# Patient Record
Sex: MALE | Race: BLACK OR AFRICAN AMERICAN | NOT HISPANIC OR LATINO | Employment: FULL TIME | URBAN - METROPOLITAN AREA
[De-identification: names, ages, dates, MRNs, and addresses within clinical notes are randomized per-mention and may not be internally consistent; named-entity substitution may affect disease eponyms.]

---

## 2018-10-01 ENCOUNTER — OFFICE VISIT CONVERTED (OUTPATIENT)
Dept: FAMILY MEDICINE CLINIC | Facility: CLINIC | Age: 38
End: 2018-10-01
Attending: FAMILY MEDICINE

## 2018-10-01 ENCOUNTER — CONVERSION ENCOUNTER (OUTPATIENT)
Dept: OTHER | Facility: HOSPITAL | Age: 38
End: 2018-10-01

## 2018-10-09 ENCOUNTER — OFFICE VISIT CONVERTED (OUTPATIENT)
Dept: CARDIOLOGY | Facility: CLINIC | Age: 38
End: 2018-10-09
Attending: SPECIALIST

## 2018-10-09 ENCOUNTER — CONVERSION ENCOUNTER (OUTPATIENT)
Dept: CARDIOLOGY | Facility: CLINIC | Age: 38
End: 2018-10-09

## 2018-11-01 ENCOUNTER — OFFICE VISIT CONVERTED (OUTPATIENT)
Dept: FAMILY MEDICINE CLINIC | Facility: CLINIC | Age: 38
End: 2018-11-01
Attending: FAMILY MEDICINE

## 2018-11-01 ENCOUNTER — CONVERSION ENCOUNTER (OUTPATIENT)
Dept: FAMILY MEDICINE CLINIC | Facility: CLINIC | Age: 38
End: 2018-11-01

## 2018-12-07 ENCOUNTER — OFFICE VISIT CONVERTED (OUTPATIENT)
Dept: FAMILY MEDICINE CLINIC | Facility: CLINIC | Age: 38
End: 2018-12-07
Attending: NURSE PRACTITIONER

## 2019-05-02 ENCOUNTER — CONVERSION ENCOUNTER (OUTPATIENT)
Dept: FAMILY MEDICINE CLINIC | Facility: CLINIC | Age: 39
End: 2019-05-02

## 2019-05-02 ENCOUNTER — OFFICE VISIT CONVERTED (OUTPATIENT)
Dept: FAMILY MEDICINE CLINIC | Facility: CLINIC | Age: 39
End: 2019-05-02
Attending: NURSE PRACTITIONER

## 2019-05-29 ENCOUNTER — OFFICE VISIT CONVERTED (OUTPATIENT)
Dept: FAMILY MEDICINE CLINIC | Facility: CLINIC | Age: 39
End: 2019-05-29
Attending: NURSE PRACTITIONER

## 2019-05-29 ENCOUNTER — CONVERSION ENCOUNTER (OUTPATIENT)
Dept: FAMILY MEDICINE CLINIC | Facility: CLINIC | Age: 39
End: 2019-05-29

## 2019-06-04 ENCOUNTER — OFFICE VISIT CONVERTED (OUTPATIENT)
Dept: CARDIOLOGY | Facility: CLINIC | Age: 39
End: 2019-06-04
Attending: SPECIALIST

## 2019-10-16 ENCOUNTER — HOSPITAL ENCOUNTER (OUTPATIENT)
Dept: URGENT CARE | Facility: CLINIC | Age: 39
Discharge: HOME | End: 2019-10-16

## 2019-10-18 LAB — BACTERIA SPEC AEROBE CULT: NORMAL

## 2020-02-15 ENCOUNTER — HOSPITAL ENCOUNTER (OUTPATIENT)
Dept: URGENT CARE | Facility: CLINIC | Age: 40
Discharge: HOME | End: 2020-02-15

## 2020-02-17 ENCOUNTER — OFFICE VISIT CONVERTED (OUTPATIENT)
Dept: FAMILY MEDICINE CLINIC | Facility: CLINIC | Age: 40
End: 2020-02-17
Attending: NURSE PRACTITIONER

## 2020-02-17 ENCOUNTER — CONVERSION ENCOUNTER (OUTPATIENT)
Dept: FAMILY MEDICINE CLINIC | Facility: CLINIC | Age: 40
End: 2020-02-17

## 2020-02-28 ENCOUNTER — OFFICE VISIT CONVERTED (OUTPATIENT)
Dept: FAMILY MEDICINE CLINIC | Facility: CLINIC | Age: 40
End: 2020-02-28
Attending: NURSE PRACTITIONER

## 2020-02-28 ENCOUNTER — HOSPITAL ENCOUNTER (OUTPATIENT)
Dept: FAMILY MEDICINE CLINIC | Facility: CLINIC | Age: 40
Discharge: HOME | End: 2020-02-28
Attending: NURSE PRACTITIONER

## 2020-02-28 ENCOUNTER — CONVERSION ENCOUNTER (OUTPATIENT)
Dept: FAMILY MEDICINE CLINIC | Facility: CLINIC | Age: 40
End: 2020-02-28

## 2020-02-28 LAB
APPEARANCE UR: CLEAR
BILIRUB UR QL: NEGATIVE
COLOR UR: YELLOW
CONV BACTERIA: NEGATIVE
CONV COLLECTION SOURCE (UA): ABNORMAL
CONV HYALINE CASTS IN URINE MICRO: ABNORMAL /[LPF]
CONV UROBILINOGEN IN URINE BY AUTOMATED TEST STRIP: 1 {EHRLICHU}/DL (ref 0.1–1)
GLUCOSE UR QL: NEGATIVE MG/DL
HGB UR QL STRIP: ABNORMAL
KETONES UR QL STRIP: ABNORMAL MG/DL
LEUKOCYTE ESTERASE UR QL STRIP: ABNORMAL
NITRITE UR QL STRIP: NEGATIVE
PH UR STRIP.AUTO: 6.5 [PH] (ref 5–8)
PROT UR QL: ABNORMAL MG/DL
RBC #/AREA URNS HPF: ABNORMAL /[HPF]
SP GR UR: 1.02 (ref 1–1.03)
WBC #/AREA URNS HPF: ABNORMAL /[HPF]

## 2020-03-01 LAB — BACTERIA UR CULT: NORMAL

## 2020-03-02 LAB
C TRACH RRNA CVX QL NAA+PROBE: NOT DETECTED
CONV HIV-1/ HIV-2: NEGATIVE
HSV I/II IGM: 1.13 RATIO (ref 0–0.9)
HSV1 IGG SER IA-ACNC: 32.6 INDEX (ref 0–0.9)
HSV2 IGG SER IA-ACNC: <0.91 INDEX (ref 0–0.9)
N GONORRHOEA DNA SPEC QL NAA+PROBE: NOT DETECTED
RPR SER QL: ABNORMAL

## 2020-04-03 ENCOUNTER — HOSPITAL ENCOUNTER (OUTPATIENT)
Dept: FAMILY MEDICINE CLINIC | Facility: CLINIC | Age: 40
Discharge: HOME | End: 2020-04-03
Attending: FAMILY MEDICINE

## 2020-04-03 ENCOUNTER — OFFICE VISIT CONVERTED (OUTPATIENT)
Dept: FAMILY MEDICINE CLINIC | Facility: CLINIC | Age: 40
End: 2020-04-03
Attending: FAMILY MEDICINE

## 2020-04-03 LAB
C TRACH RRNA CVX QL NAA+PROBE: NOT DETECTED
N GONORRHOEA DNA SPEC QL NAA+PROBE: NOT DETECTED

## 2020-04-05 LAB — BACTERIA UR CULT: NORMAL

## 2020-04-22 ENCOUNTER — HOSPITAL ENCOUNTER (OUTPATIENT)
Dept: URGENT CARE | Facility: CLINIC | Age: 40
Discharge: HOME | End: 2020-04-22
Attending: PHYSICIAN ASSISTANT

## 2020-04-22 LAB
C TRACH RRNA CVX QL NAA+PROBE: NOT DETECTED
N GONORRHOEA DNA SPEC QL NAA+PROBE: NOT DETECTED

## 2020-07-23 ENCOUNTER — HOSPITAL ENCOUNTER (OUTPATIENT)
Dept: URGENT CARE | Facility: CLINIC | Age: 40
Discharge: HOME | End: 2020-07-23
Attending: EMERGENCY MEDICINE

## 2020-07-23 LAB
C TRACH RRNA CVX QL NAA+PROBE: NOT DETECTED
N GONORRHOEA DNA SPEC QL NAA+PROBE: NOT DETECTED

## 2020-07-29 ENCOUNTER — HOSPITAL ENCOUNTER (OUTPATIENT)
Dept: URGENT CARE | Facility: CLINIC | Age: 40
Discharge: HOME | End: 2020-07-29

## 2020-08-14 ENCOUNTER — HOSPITAL ENCOUNTER (OUTPATIENT)
Dept: FAMILY MEDICINE CLINIC | Facility: CLINIC | Age: 40
Discharge: HOME | End: 2020-08-14
Attending: FAMILY MEDICINE

## 2020-08-14 ENCOUNTER — CONVERSION ENCOUNTER (OUTPATIENT)
Dept: OTHER | Facility: HOSPITAL | Age: 40
End: 2020-08-14

## 2020-08-14 ENCOUNTER — OFFICE VISIT CONVERTED (OUTPATIENT)
Dept: FAMILY MEDICINE CLINIC | Facility: CLINIC | Age: 40
End: 2020-08-14
Attending: FAMILY MEDICINE

## 2020-08-15 LAB
CHOLEST SERPL-MCNC: 207 MG/DL (ref 107–200)
CHOLEST/HDLC SERPL: 4.2 {RATIO} (ref 3–6)
CK SERPL-CCNC: 883 U/L (ref 57–374)
HDLC SERPL-MCNC: 49 MG/DL (ref 40–60)
LDLC SERPL CALC-MCNC: 136 MG/DL (ref 70–100)
TRIGL SERPL-MCNC: 108 MG/DL (ref 40–150)
VLDLC SERPL-MCNC: 22 MG/DL (ref 5–37)

## 2020-08-17 LAB — SARS-COV-2 RNA SPEC QL NAA+PROBE: NOT DETECTED

## 2020-08-21 ENCOUNTER — OFFICE VISIT CONVERTED (OUTPATIENT)
Dept: CARDIOLOGY | Facility: CLINIC | Age: 40
End: 2020-08-21
Attending: SPECIALIST

## 2020-08-21 ENCOUNTER — CONVERSION ENCOUNTER (OUTPATIENT)
Dept: CARDIOLOGY | Facility: CLINIC | Age: 40
End: 2020-08-21

## 2020-10-02 ENCOUNTER — HOSPITAL ENCOUNTER (OUTPATIENT)
Dept: URGENT CARE | Facility: CLINIC | Age: 40
Discharge: HOME | End: 2020-10-02
Attending: PHYSICIAN ASSISTANT

## 2020-10-13 ENCOUNTER — OFFICE VISIT CONVERTED (OUTPATIENT)
Dept: FAMILY MEDICINE CLINIC | Facility: CLINIC | Age: 40
End: 2020-10-13
Attending: FAMILY MEDICINE

## 2020-10-13 ENCOUNTER — CONVERSION ENCOUNTER (OUTPATIENT)
Dept: FAMILY MEDICINE CLINIC | Facility: CLINIC | Age: 40
End: 2020-10-13

## 2020-10-13 ENCOUNTER — OFFICE VISIT CONVERTED (OUTPATIENT)
Dept: CARDIOLOGY | Facility: CLINIC | Age: 40
End: 2020-10-13
Attending: SPECIALIST

## 2020-12-01 ENCOUNTER — HOSPITAL ENCOUNTER (OUTPATIENT)
Dept: URGENT CARE | Facility: CLINIC | Age: 40
Discharge: HOME | End: 2020-12-01
Attending: EMERGENCY MEDICINE

## 2020-12-26 ENCOUNTER — HOSPITAL ENCOUNTER (OUTPATIENT)
Dept: URGENT CARE | Facility: CLINIC | Age: 40
Discharge: HOME | End: 2020-12-26
Attending: EMERGENCY MEDICINE

## 2020-12-27 LAB — SARS-COV-2 RNA SPEC QL NAA+PROBE: NOT DETECTED

## 2021-01-14 ENCOUNTER — HOSPITAL ENCOUNTER (OUTPATIENT)
Dept: URGENT CARE | Facility: CLINIC | Age: 41
Discharge: HOME | End: 2021-01-14
Attending: PHYSICIAN ASSISTANT

## 2021-01-16 LAB — SARS-COV-2 RNA SPEC QL NAA+PROBE: DETECTED

## 2021-03-24 ENCOUNTER — HOSPITAL ENCOUNTER (OUTPATIENT)
Dept: URGENT CARE | Facility: CLINIC | Age: 41
Discharge: HOME | End: 2021-03-24
Attending: EMERGENCY MEDICINE

## 2021-05-10 NOTE — PROCEDURES
"   Procedure Note      Patient Name: Darrion Anglin Jr.   Patient ID: [id number]   Sex: Male   YOB: 1980    Primary Care Provider: Carlee Arroyo MD   Referring Provider: Carlee Arroyo MD    Visit Date: October 13, 2020    Provider: Geoff Gee MD   Location: [facility]   Location Address: [address] [place] [address], [address]   [place]  [id number]   Location Phone: [phone]          FINAL REPORT   TRANSTHORACIC ECHOCARDIOGRAM REPORT    Diagnosis: Chest pain, precordial   Height: 5'5\" Weight: 183 B/P: 150/92 BSA: 1.9   Tech: JTW   MEASUREMENTS:  RVID (Diastole) : RVID. (NORMAL: 0.7 to 2.4 cm max)   LVID (Systole): 3.6 cm (Diastole): 5.1 cm . (NORMAL: 3.7 - 5.4 cm)   Posterior Wall Thickness (Diastole): 1.2 cm. (NORMAL: 0.8 - 1.1 cm)   Septal Thickness (Diastole): 1.2 cm. (NORMAL: 0.7 - 1.2 cm)   LAID (Systole): 3.0 cm. (NORMAL: 1.9 - 3.8 cm)   Aortic Root Diameter (Diastole): 3.5 cm. (NORMAL: 2.0 - 3.7 cm)   DOPPLER:  E/A ratio 1.4 (NORMAL 0.8-2.0)   DT: 144 msec (NORMAL 140-240 msec.)   IVRT 77 m/sec (NORMAL  m/sec.)   E/E': 10 (NORMAL <8 avg.)   COMMENTS:  The patient underwent 2-D, M-Mode, and Doppler examination, including pulse-wave, continuous-wave, and color-flow analysis; the study is technically adequate.   FINDINGS:  AORTIC VALVE: Normal. Tricuspid in appearance with normal central closure.   MITRAL VALVE: Normal. Bicuspid in appearance.   TRICUSPID VALVE: Normal.   PULMONIC VALVE: Not well visualized.   LEFT ATRIUM: Normal. No intracavitary masses or clots seen. LA volume index is 21 mL/m2.   AORTIC ROOT: Normal in size with adequate motion.   LEFT VENTRICLE: Mild left ventricular hypertrophy. Normal left ventricular systolic function. Ejection fraction 53%.   RIGHT ATRIUM: Normal.   RIGHT VENTRICLE: Normal size and function.   PERICARDIUM: Unremarkable. No evidence of effusion.   INFERIOR VENA CAVA: Diameter is 1.5 cm.   DOPPLER: Doppler examination of the aortic, " mitral, tricuspid, and pulmonary valves was performed. Normal pulmonary artery systolic pressure by Doppler. No significant valvular abnormalities.   Faxed: 10/13/2020      CONCLUSION:  1.  Mild left ventricular hypertrophy.   2.  Normal left ventricular systolic function.   3.  No significant valvular abnormalities noted.        MD PIA Mccartney/inocente    This note was transcribed by Maru Oropeza.  inocente/PIA  The above service was transcribed by Maru Oropeza, and I attest to the accuracy of the note.  PIA                 Electronically Signed by: Kassie Oropeza-, Other -Author on October 13, 2020 03:58:05 PM  Electronically Co-signed by: Geoff Gee MD -Reviewer on October 20, 2020 02:22:55 PM

## 2021-05-12 NOTE — PROGRESS NOTES
Progress Note      Patient Name: Darrion Anglin Jr.   Patient ID: [id number]   Sex: Male   YOB: 1980    Primary Care Provider: Carlee Arroyo MD   Referring Provider: Carlee Arroyo MD    Visit Date: April 3, 2020    Provider: Carlee Arroyo MD   Location: [facility]   Location Address: [address] [place] [address], [address] [place] [address]  [id number]   Location Phone: [phone]          Chief Complaint  · Possible STD exposure      History Of Present Illness  Darrion Anglin Jr. is a 39 year old /Black male who presents for evaluation and treatment of:      Penile dischargepatient reports that he has been having unprotected sex and had noticed a white stringy penile discharge along with some dark-colored urine.  His urinalysis in the office today showed positive leukocytes and positive blood.  I will be sending his urine off for culture and also checking for GC and chlamydia.  I will be treating him for STD exposure with Rocephin 250 and azithromycin 1 g and will be calling him later for culture results.Patient has no fever no chills no abdominal pain no vomiting.       Past Medical History  Allergies; Anxiety; Bipolar disorder; Depression; Eczema; Forgetfulness; Head injury; Hernia; Hypertension; Migraine; Sinus trouble; SOB (shortness of breath); STD exposure         Past Surgical History  *Denies any surgical procedures         Medication List  hydrochlorothiazide 25 mg oral tablet         Allergy List  NO KNOWN DRUG ALLERGIES         Family Medical History  Breast Neoplasm, Malignant; Diabetes, unspecified type         Social History  Tobacco (Current every day)         Immunizations  Name Date Admin   Influenza    Influenza          Review of Systems  · Constitutional  o Denies  o : fatigue, night sweats  · Eyes  o Denies  o : double vision, blurred vision  · HENT  o Denies  o : vertigo, recent head injury  · Breasts  o Denies  o : abnormal changes in breast size, additional  "breast symptoms except as noted in the HPI  · Cardiovascular  o Denies  o : chest pain, irregular heart beats  · Respiratory  o Denies  o : shortness of breath, productive cough  · Gastrointestinal  o Denies  o : nausea, vomiting  · Genitourinary  o Admits  o : penile discharge  o Denies  o : dysuria, urinary retention  · Integument  o Denies  o : hair growth change, new skin lesions  · Neurologic  o Denies  o : altered mental status, seizures  · Musculoskeletal  o Denies  o : joint swelling, limitation of motion  · Endocrine  o Denies  o : cold intolerance, heat intolerance  · Heme-Lymph  o Denies  o : petechiae, lymph node enlargement or tenderness  · Allergic-Immunologic  o Denies  o : frequent illnesses      Vitals  Date Time BP Position Site L\R Cuff Size HR RR TEMP (F) WT  HT  BMI kg/m2 BSA m2 O2 Sat        04/03/2020 08:32 /90 Sitting    85 - R  98.1 177lbs 2oz 5'  5\" 29.47 1.92 95 %          Physical Examination  · Constitutional  o Appearance  o : well-nourished, in no acute distress  · Eyes  o Conjunctivae  o : conjunctivae normal  o Sclerae  o : sclerae white  o Pupils and Irises  o : pupils equal, round, and reactive to light and accommodation bilaterally  o Eyelids/Ocular Adnexae  o : eyelid appearance normal, no exudates present  · Ears, Nose, Mouth and Throat  o Ears  o :   § External Ears  § : external auditory canal appearance within normal limits, no discharge present  § Otoscopic Examination  § : tympanic membrane appearance within normal limits bilaterally  o Nose  o :   § External Nose  § : appearance normal  § Nasopharynx  § : no discharge present  o Oral Cavity  o :   § Oral Mucosa  § : oral mucosa normal  § Lips  § : lip appearance normal  o Throat  o :   § Oropharynx  § : no inflammation or lesions present, tonsils within normal limits  · Neck  o Range of Motion  o : cervical range of motion within normal limits  · Respiratory  o Respiratory Effort  o : breathing " unlabored  o Inspection of Chest  o : normal appearance  o Auscultation of Lungs  o : normal breath sounds throughout  · Cardiovascular  o Heart  o :   § Auscultation of Heart  § : regular rate and rhythm, no murmurs, gallops or rubs  o Peripheral Vascular System  o :   § Extremities  § : no edema  · Gastrointestinal  o Abdominal Examination  o : abdomen nontender to palpation, tone normal without rigidity or guarding, no masses present, bowel sounds present in all four quadrants  o Liver and spleen  o : no hepatomegaly present, liver nontender to palpation, spleen not palpable  o Hernias  o : no hernias present  · Lymphatic  o Neck  o : no lymphadenopathy present  · Musculoskeletal  o Spine  o :   § Inspection/Palpation  § : no spinal tenderness, scoliosis or kyphosis present  § Stability  § : no subluxations present  § Range of Motion  § : spine range of motion normal  o Right Upper Extremity  o :   § Inspection/Palpation  § : no tenderness to palpation, ROM normal  o Left Upper Extremity  o :   § Inspection/Palpation  § : no tenderness to palpation, ROM normal  o Right Lower Extremity  o :   § Inspection/Palpation  § : no joint or limb tenderness to palpation, ROM normal  o Left Lower Extremity  o :   § Inspection/Palpation  § : no joint or limb tenderness to palpation, ROM normal  · Skin and Subcutaneous Tissue  o General Inspection  o : no rashes or lesions present, no areas of discoloration  o Body Hair  o : scalp palpation normal, hair normal for age, general body hair distribution normal for age  o Digits and Nails  o : no clubbing, cyanosis, deformities or edema present, normal appearing nails  · Neurologic  o Mental Status Examination  o :   § Orientation  § : grossly oriented to person, place and time  o Gait and Station  o : normal gait, able to stand without difficulty  · Psychiatric  o Judgement and Insight  o : judgment and insight intact  o Mood and Affect  o : mood normal, affect  appropriate          Results  · In-Office Procedures  o Lab procedure  § IOP - Urinalysis without Microscopy (Clinitek) [facility] ([id number])   § Color Ur: Dark yellow   § Clarity Ur: Slightly cloudy   § Glucose Ur Ql Strip: Negative   § Bilirub Ur Ql Strip: Negative   § Ketones Ur Ql Strip: Negative   § Sp Gr Ur Qn: greater than 1.030   § Hgb Ur Ql Strip: Small   § pH Ur-LsCnc: 6.5   § Prot Ur Ql Strip: Negative   § Urobilinogen Ur Strip-mCnc: 0.2 E.U./dL   § Nitrite Ur Ql Strip: Negative   § WBC Est Ur Ql Strip: Trace       Assessment  · STD exposure     V01.6/Z20.2  · Penile discharge     788.7/R36.9    Problems Reconciled  Plan  · Orders  o GC/Chlamydia by PCR (Urine or Vaginal Swab) [facility] ([facility]) - V01.6/Z20.2 - 04/03/2020  o Urine culture ([id number], [id number]) - V01.6/Z20.2, 788.7/R36.9 - 04/03/2020  o ACO-39: Current medications updated and reviewed () - - 04/03/2020  o ACO-14: Influenza immunization administered or previously received () - - 04/03/2020  o Rocephin Injection 250mg mixed with Lidocaine () () - V01.6/Z20.2 - 04/03/2020   Injection - Rocephin; Dose: 250 mg; Site: Right Hip; Route: intramuscular; Date: 04/03/2020 09:04:49; Exp: 06/20/2020; Lot: [id number]; Mfg: [id number]; TradeName: ceftriaxone; Location: [facility]; Administered By: Apoorva Martinez MA; Comment: tolerated well, stable  · Medications  o azithromycin 250 mg oral tablet   SIG: take 4 tablets (1,000 mg) by oral route as a single dose for 1 day   DISP: (4) tablets with 0 refills  Prescribed on 04/03/2020     o Medications have been Reconciled  o Transition of Care or Provider Policy  · Instructions  o Patient was educated/instructed on their diagnosis, treatment and medications prior to discharge from the clinic today.  o Minutes spent with patient including greater than 50% in Education/Counseling/Care Coordination.  o Time spent with the patient was minutes, more than 50% face to face. 20 minutes  · Disposition  o Call or Return if  symptoms worsen or persist.            Electronically Signed by: Carlee Arroyo MD -Author on June 22, 2020 06:43:07 AM

## 2021-05-13 NOTE — PROGRESS NOTES
"   Progress Note      Patient Name: Darrion Anglin Jr.   Patient ID: [id number]   Sex: Male   YOB: 1980    Primary Care Provider: Carlee Arroyo MD   Referring Provider: Carlee Arroyo MD    Visit Date: August 21, 2020    Provider: Geoff Gee MD   Location: [facility]   Location Address: [address] [place] [address], [facility]   [place], [place]  [id number]   Location Phone: [phone]          Chief Complaint  · Chest pain   · Shortness of breath       History Of Present Illness  Darrion Anglin Jr. is a 40 year old /Black male with a history of chest pain on and off for the last several years, substernal, aching. He continues to have chest pain. He was supposed to get a stress test, which he canceled. It is non-exertional and relieved spontaneously.   CURRENT MEDICATIONS: include HCTZ 25 mg p.r.n.; aspirin 81 mg daily. The dosage and frequency of the medications were reviewed with the patient.   PAST MEDICAL HISTORY: Positive for hypertension and chest pain.   FAMILY HISTORY: Positive for diabetes and heart disease. Negative for hypertension.   PSYCHOSOCIAL HISTORY: Positive for mood changes and depression. He drinks a moderate amount of alcohol and currently uses tobacco.       Review of Systems  · Cardiovascular  o Admits  o : palpitations (fast, fluttering, or skipping beats), shortness of breath while walking or lying flat, chest pain or angina pectoris   o Denies  o : swelling (feet, ankles, hands)  · Respiratory  o Admits  o : chronic or frequent cough, asthma or wheezing      Vitals  Date Time BP Position Site L\R Cuff Size HR RR TEMP (F) WT  HT  BMI kg/m2 BSA m2 O2 Sat HC       08/21/2020 12:17 /78 Sitting    80 - R   174lbs 0oz 5'  5\" 28.95 1.9           Physical Examination  · Constitutional  o Appearance  o : Awake, alert, cooperative, pleasant.  · Respiratory  o Inspection of Chest  o : No chest wall deformities, moving equal.  o Auscultation of " Lungs  o : Good air entry with vesicular breath sounds.  · Cardiovascular  o Heart  o :   § Auscultation of Heart  § : S1 and S2 regular. No S3. No S4. No murmurs.  o Peripheral Vascular System  o :   § Extremities  § : Peripheral pulses were well felt. No edema. No cyanosis.  · Gastrointestinal  o Abdominal Examination  o : No masses or organomegaly noted.          Assessment     ASSESSMENT AND PLAN:    1.  Precordial atypical chest pain:  Will schedule him for a treadmill stress test to rule out any significant        ischemia.  2.  Essential hypertension controlled:  Continue current dose of HCTZ.  3.  Positive for nicotine use:  Smoking-cessation instructions were discussed with the patient.  4.  Will also do an echocardiogram to evaluate left ventricular systolic function.    Geoff Gee MD, [facility]  PIA/mariela           This note was transcribed by Brittani Angelo.  mariela/PIA  The above service was transcribed by Brittani Angelo, and I attest to the accuracy of the note.  PIA               Electronically Signed by: Brittani Angelo-, -Author on August 24, 2020 09:24:51 AM  Electronically Co-signed by: Geoff Gee MD -Reviewer on August 27, 2020 08:49:04 PM

## 2021-05-13 NOTE — PROGRESS NOTES
Progress Note      Patient Name: Darrion Anglin Jr.   Patient ID: [id number]   Sex: Male   YOB: 1980    Primary Care Provider: Carlee Arroyo MD   Referring Provider: Carlee Arroyo MD    Visit Date: October 13, 2020    Provider: Carlee Arroyo MD   Location: [facility]   Location Address: [address] [place] [address], [address]  [id number]   Location Phone: [phone]          Chief Complaint  · Hypertension follow up      History Of Present Illness  Darrion Anglin Jr. is a 40 year old /Black male who presents for evaluation and treatment of:      High blood pressurepatient reports that he had not taken his blood pressure medicine today since he ran out of this prescription.  Patient reports he tried to call the office for his neighbor to get through.  We will be refilling his hydrochlorothiazide 25 mg once a day today he has an appointment with Dr. Gee for cardiology at 1:00 where he will be getting a stress test.  Patient reports that he still been having recurrences of chest pain.  Patient is still a current smoker.  I informed him to go body pharmacy get his blood pressure medicine immediately take if there is blood pressures not elevated for his test.  Also informed him not to smoke any cigarettes for his appointment when 1:00.       Past Medical History  Allergies; Anxiety; Bipolar disorder; Depression; Eczema; Forgetfulness; Head injury; Hernia; Hypertension; Migraine; Sinus trouble; SOB (shortness of breath); STD exposure         Past Surgical History  *Denies any surgical procedures         Medication List  Christin Baby Aspirin 81 mg; hydrochlorothiazide 25 mg oral tablet         Allergy List  NO KNOWN DRUG ALLERGIES         Family Medical History  Breast Neoplasm, Malignant; Diabetes, unspecified type         Social History  Tobacco (Current every day)         Immunizations  Name Date Admin   Influenza 10/01/2019   Influenza 10/04/2018  "        Review of Systems  · Constitutional  o Denies  o : fatigue, fever, weight loss, weight gain  · Eyes  o Denies  o : eye discomfort, eye pain  · HENT  o Denies  o : vertigo, nasal congestion  · Cardiovascular  o Admits  o : chest pain  o Denies  o : irregular heart beats  · Respiratory  o Denies  o : shortness of breath, wheezing  · Gastrointestinal  o Denies  o : nausea, vomiting  · Genitourinary  o Denies  o : frequency, dysuria  · Integument  o Denies  o : rash, itching  · Neurologic  o Denies  o : muscular weakness, memory difficulties  · Musculoskeletal  o Denies  o : joint swelling, muscle pain  · Psychiatric  o Denies  o : anxiety, depression  · Heme-Lymph  o Denies  o : lightheadedness, easy bleeding  · Allergic-Immunologic  o Denies  o : sinus allergy symptoms, allergic dermatitis      Vitals  Date Time BP Position Site L\R Cuff Size HR RR TEMP (F) WT  HT  BMI kg/m2 BSA m2 O2 Sat FR L/min FiO2        10/13/2020 09:58 /92 Sitting    79 - R 14 97.3 183lbs 5oz 5'  5\" 30.5 1.95 97 %            Physical Examination  · Constitutional  o Appearance  o : well-nourished, in no acute distress  · Eyes  o Conjunctivae  o : conjunctivae normal  o Sclerae  o : sclerae white  o Pupils and Irises  o : pupils equal, round, and reactive to light and accommodation bilaterally  o Eyelids/Ocular Adnexae  o : eyelid appearance normal, no exudates present  · Ears, Nose, Mouth and Throat  o Ears  o :   § External Ears  § : external auditory canal appearance within normal limits, no discharge present  § Otoscopic Examination  § : tympanic membrane appearance within normal limits bilaterally  o Nose  o :   § External Nose  § : appearance normal  § Nasopharynx  § : no discharge present  o Oral Cavity  o :   § Oral Mucosa  § : oral mucosa normal  § Lips  § : lip appearance normal  o Throat  o :   § Oropharynx  § : no inflammation or lesions present, tonsils within normal limits  · Neck  o Inspection/Palpation  o : normal " appearance, no masses or tenderness, trachea midline  o Range of Motion  o : cervical range of motion within normal limits  o Thyroid  o : gland size normal, nontender, no nodules or masses present on palpation  o Jugular Veins  o : JVP normal  · Respiratory  o Respiratory Effort  o : breathing unlabored  o Inspection of Chest  o : normal appearance  o Auscultation of Lungs  o : normal breath sounds throughout  · Cardiovascular  o Heart  o :   § Auscultation of Heart  § : regular rate and rhythm, no murmurs, gallops or rubs  o Peripheral Vascular System  o :   § Extremities  § : no edema  · Gastrointestinal  o Abdominal Examination  o : abdomen nontender to palpation, tone normal without rigidity or guarding, no masses present, bowel sounds present in all four quadrants  o Liver and spleen  o : no hepatomegaly present, liver nontender to palpation, spleen not palpable  o Hernias  o : no hernias present  · Lymphatic  o Neck  o : no lymphadenopathy present  · Musculoskeletal  o Spine  o :   § Inspection/Palpation  § : no spinal tenderness, scoliosis or kyphosis present  § Stability  § : no subluxations present  § Range of Motion  § : spine range of motion normal  o Right Upper Extremity  o :   § Inspection/Palpation  § : no tenderness to palpation, ROM normal  o Left Upper Extremity  o :   § Inspection/Palpation  § : no tenderness to palpation, ROM normal  o Right Lower Extremity  o :   § Inspection/Palpation  § : no joint or limb tenderness to palpation, ROM normal  o Left Lower Extremity  o :   § Inspection/Palpation  § : no joint or limb tenderness to palpation, ROM normal  · Skin and Subcutaneous Tissue  o General Inspection  o : no rashes or lesions present, no areas of discoloration  o Body Hair  o : scalp palpation normal, hair normal for age, general body hair distribution normal for age  o Digits and Nails  o : no clubbing, cyanosis, deformities or edema present, normal appearing nails  · Neurologic  o Mental  Status Examination  o :   § Orientation  § : grossly oriented to person, place and time  o Gait and Station  o : normal gait, able to stand without difficulty  · Psychiatric  o Judgement and Insight  o : judgment and insight intact  o Mood and Affect  o : mood normal, affect appropriate              Assessment  · Chest pain     786.50/R07.9  · Essential hypertension     401.9/I10  · Tobacco abuse counseling       Tobacco abuse counseling     V65.42/Z71.6  · Screening for depression     V79.0/Z13.89  · Need for influenza vaccination     V04.81/Z23  · Tinea pedis     110.4/B35.3      Plan  · Orders  o ACO-17: Screened for tobacco use AND received tobacco cessation intervention (4004F) - V65.42/Z71.6 - 10/13/2020  o Immunization Admin Fee (Single) ([facility]) (41309) - V04.81/Z23 - 10/13/2020  o Fluarix QUADRIVALENT Vaccine, Syringe, Latex Free, Preservative Free, age 3+ (28538) - V04.81/Z23 - 10/13/2020  o ACO-39: Current medications updated and reviewed (, 1159F) - - 10/13/2020  o ACO-18: Positive screen for clinical depression using a standardized tool and a follow-up plan documented () - - 10/13/2020  o ACO-14: Influenza immunization administered or previously received [facility] () - - 10/13/2020  · Medications  o ketoconazole 2 % topical cream   SIG: apply to the affected area(s) by topical route once daily for 2 weeks   DISP: (1) Tube with 0 refills  Prescribed on 10/13/2020     o hydrochlorothiazide 25 mg oral tablet   SIG: take 1 tablet (25 mg) by oral route once daily for 90 days   DISP: (90) Tablet with 1 refills  Adjusted on 10/13/2020     o Medications have been Reconciled  o Transition of Care or Provider Policy  · Instructions  o Tobacco and smoking cessation counseling for more than 3 minutes was completed.  o Depression Screen completed and scanned into the EMR under the designated folder within the patient's documents.  o Today's PHQ-9 result is _17__  o Patient was educated/instructed on their  diagnosis, treatment and medications prior to discharge from the clinic today.  o Minutes spent with patient including greater than 50% in Education/Counseling/Care Coordination.  o Time spent with the patient was minutes, more than 50% face to face. 25 minutes  · Disposition  o f/u 3 months            Electronically Signed by: Carlee Arroyo MD -Author on October 13, 2020 10:52:47 AM
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
## 2021-05-14 VITALS
TEMPERATURE: 97.3 F | HEART RATE: 79 BPM | SYSTOLIC BLOOD PRESSURE: 150 MMHG | RESPIRATION RATE: 14 BRPM | WEIGHT: 183.31 LBS | HEIGHT: 65 IN | BODY MASS INDEX: 30.54 KG/M2 | OXYGEN SATURATION: 97 % | DIASTOLIC BLOOD PRESSURE: 92 MMHG

## 2021-05-15 VITALS
BODY MASS INDEX: 28.99 KG/M2 | DIASTOLIC BLOOD PRESSURE: 78 MMHG | WEIGHT: 174 LBS | HEIGHT: 65 IN | HEART RATE: 80 BPM | SYSTOLIC BLOOD PRESSURE: 116 MMHG

## 2021-05-15 VITALS — DIASTOLIC BLOOD PRESSURE: 88 MMHG | SYSTOLIC BLOOD PRESSURE: 142 MMHG

## 2021-05-15 VITALS
HEIGHT: 65 IN | SYSTOLIC BLOOD PRESSURE: 138 MMHG | RESPIRATION RATE: 16 BRPM | WEIGHT: 181 LBS | HEART RATE: 80 BPM | BODY MASS INDEX: 30.16 KG/M2 | TEMPERATURE: 97.6 F | DIASTOLIC BLOOD PRESSURE: 82 MMHG | OXYGEN SATURATION: 99 %

## 2021-05-15 VITALS
SYSTOLIC BLOOD PRESSURE: 130 MMHG | HEIGHT: 65 IN | DIASTOLIC BLOOD PRESSURE: 84 MMHG | WEIGHT: 180 LBS | BODY MASS INDEX: 29.99 KG/M2 | HEART RATE: 70 BPM

## 2021-05-15 VITALS
BODY MASS INDEX: 29.24 KG/M2 | TEMPERATURE: 98.7 F | HEART RATE: 75 BPM | HEIGHT: 65 IN | WEIGHT: 175.5 LBS | DIASTOLIC BLOOD PRESSURE: 86 MMHG | OXYGEN SATURATION: 100 % | SYSTOLIC BLOOD PRESSURE: 134 MMHG

## 2021-05-15 VITALS
BODY MASS INDEX: 29.84 KG/M2 | TEMPERATURE: 96.7 F | HEIGHT: 65 IN | DIASTOLIC BLOOD PRESSURE: 100 MMHG | SYSTOLIC BLOOD PRESSURE: 150 MMHG | WEIGHT: 179.12 LBS | HEART RATE: 65 BPM | OXYGEN SATURATION: 99 %

## 2021-05-15 VITALS
SYSTOLIC BLOOD PRESSURE: 138 MMHG | BODY MASS INDEX: 29.16 KG/M2 | HEIGHT: 65 IN | TEMPERATURE: 97.5 F | WEIGHT: 175 LBS | DIASTOLIC BLOOD PRESSURE: 88 MMHG | OXYGEN SATURATION: 100 % | HEART RATE: 76 BPM | RESPIRATION RATE: 16 BRPM

## 2021-05-15 VITALS — SYSTOLIC BLOOD PRESSURE: 146 MMHG | DIASTOLIC BLOOD PRESSURE: 94 MMHG

## 2021-05-15 VITALS
WEIGHT: 177.12 LBS | HEART RATE: 85 BPM | OXYGEN SATURATION: 95 % | TEMPERATURE: 98.1 F | DIASTOLIC BLOOD PRESSURE: 90 MMHG | HEIGHT: 65 IN | BODY MASS INDEX: 29.51 KG/M2 | SYSTOLIC BLOOD PRESSURE: 148 MMHG

## 2021-05-16 VITALS
HEIGHT: 65 IN | BODY MASS INDEX: 32.38 KG/M2 | OXYGEN SATURATION: 98 % | DIASTOLIC BLOOD PRESSURE: 62 MMHG | HEART RATE: 68 BPM | SYSTOLIC BLOOD PRESSURE: 122 MMHG | WEIGHT: 194.37 LBS

## 2021-05-16 VITALS
RESPIRATION RATE: 18 BRPM | TEMPERATURE: 98.2 F | SYSTOLIC BLOOD PRESSURE: 104 MMHG | HEART RATE: 80 BPM | HEIGHT: 65 IN | WEIGHT: 191.25 LBS | OXYGEN SATURATION: 97 % | BODY MASS INDEX: 31.86 KG/M2 | DIASTOLIC BLOOD PRESSURE: 68 MMHG

## 2021-05-16 VITALS
DIASTOLIC BLOOD PRESSURE: 68 MMHG | OXYGEN SATURATION: 98 % | HEART RATE: 77 BPM | TEMPERATURE: 98.7 F | BODY MASS INDEX: 29.88 KG/M2 | WEIGHT: 179.37 LBS | HEIGHT: 65 IN | SYSTOLIC BLOOD PRESSURE: 130 MMHG

## 2021-05-16 VITALS
WEIGHT: 189 LBS | DIASTOLIC BLOOD PRESSURE: 102 MMHG | SYSTOLIC BLOOD PRESSURE: 128 MMHG | HEART RATE: 90 BPM | BODY MASS INDEX: 31.49 KG/M2 | HEIGHT: 65 IN

## 2021-05-25 ENCOUNTER — HOSPITAL ENCOUNTER (OUTPATIENT)
Dept: URGENT CARE | Facility: CLINIC | Age: 41
Discharge: HOME | End: 2021-05-25
Attending: EMERGENCY MEDICINE

## 2021-06-21 ENCOUNTER — HOSPITAL ENCOUNTER (EMERGENCY)
Facility: HOSPITAL | Age: 41
Discharge: HOME | End: 2021-06-21
Attending: EMERGENCY MEDICINE | Admitting: EMERGENCY MEDICINE

## 2021-06-21 VITALS
DIASTOLIC BLOOD PRESSURE: 87 MMHG | SYSTOLIC BLOOD PRESSURE: 152 MMHG | HEART RATE: 72 BPM | OXYGEN SATURATION: 100 % | HEIGHT: 65 IN | RESPIRATION RATE: 18 BRPM | BODY MASS INDEX: 31.44 KG/M2 | TEMPERATURE: 99.1 F | WEIGHT: 188.71 LBS

## 2021-06-21 DIAGNOSIS — F41.9 ANXIETY: ICD-10-CM

## 2021-06-21 DIAGNOSIS — F33.2 SEVERE EPISODE OF RECURRENT MAJOR DEPRESSIVE DISORDER, WITHOUT PSYCHOTIC FEATURES (HCC): Primary | ICD-10-CM

## 2021-06-21 LAB
ALBUMIN SERPL-MCNC: 4.4 G/DL (ref 3.5–5.2)
ALBUMIN/GLOB SERPL: 1.8 G/DL
ALP SERPL-CCNC: 55 U/L (ref 39–117)
ALT SERPL W P-5'-P-CCNC: 17 U/L (ref 1–41)
AMPHET+METHAMPHET UR QL: NEGATIVE
ANION GAP SERPL CALCULATED.3IONS-SCNC: 9.8 MMOL/L (ref 5–15)
AST SERPL-CCNC: 22 U/L (ref 1–40)
BARBITURATES UR QL SCN: NEGATIVE
BASOPHILS # BLD AUTO: 0.02 10*3/MM3 (ref 0–0.2)
BASOPHILS NFR BLD AUTO: 0.2 % (ref 0–1.5)
BENZODIAZ UR QL SCN: NEGATIVE
BILIRUB SERPL-MCNC: 0.7 MG/DL (ref 0–1.2)
BUN SERPL-MCNC: 10 MG/DL (ref 6–20)
BUN/CREAT SERPL: 8.8 (ref 7–25)
CALCIUM SPEC-SCNC: 8.9 MG/DL (ref 8.6–10.5)
CANNABINOIDS SERPL QL: POSITIVE
CHLORIDE SERPL-SCNC: 103 MMOL/L (ref 98–107)
CO2 SERPL-SCNC: 28.2 MMOL/L (ref 22–29)
COCAINE UR QL: POSITIVE
CREAT SERPL-MCNC: 1.13 MG/DL (ref 0.76–1.27)
DEPRECATED RDW RBC AUTO: 44.8 FL (ref 37–54)
EOSINOPHIL # BLD AUTO: 0.12 10*3/MM3 (ref 0–0.4)
EOSINOPHIL NFR BLD AUTO: 0.9 % (ref 0.3–6.2)
ERYTHROCYTE [DISTWIDTH] IN BLOOD BY AUTOMATED COUNT: 12.4 % (ref 12.3–15.4)
ETHANOL BLD-MCNC: <10 MG/DL (ref 0–10)
ETHANOL UR QL: <0.01 %
GFR SERPL CREATININE-BSD FRML MDRD: 87 ML/MIN/1.73
GLOBULIN UR ELPH-MCNC: 2.4 GM/DL
GLUCOSE SERPL-MCNC: 100 MG/DL (ref 65–99)
HCT VFR BLD AUTO: 45.2 % (ref 37.5–51)
HGB BLD-MCNC: 15.2 G/DL (ref 13–17.7)
HOLD SPECIMEN: NORMAL
HOLD SPECIMEN: NORMAL
IMM GRANULOCYTES # BLD AUTO: 0.08 10*3/MM3 (ref 0–0.05)
IMM GRANULOCYTES NFR BLD AUTO: 0.6 % (ref 0–0.5)
LYMPHOCYTES # BLD AUTO: 1.9 10*3/MM3 (ref 0.7–3.1)
LYMPHOCYTES NFR BLD AUTO: 14.7 % (ref 19.6–45.3)
MCH RBC QN AUTO: 32.3 PG (ref 26.6–33)
MCHC RBC AUTO-ENTMCNC: 33.6 G/DL (ref 31.5–35.7)
MCV RBC AUTO: 96.2 FL (ref 79–97)
METHADONE UR QL SCN: NEGATIVE
MONOCYTES # BLD AUTO: 0.89 10*3/MM3 (ref 0.1–0.9)
MONOCYTES NFR BLD AUTO: 6.9 % (ref 5–12)
NEUTROPHILS NFR BLD AUTO: 76.7 % (ref 42.7–76)
NEUTROPHILS NFR BLD AUTO: 9.95 10*3/MM3 (ref 1.7–7)
NRBC BLD AUTO-RTO: 0 /100 WBC (ref 0–0.2)
OPIATES UR QL: NEGATIVE
OXYCODONE UR QL SCN: NEGATIVE
PLATELET # BLD AUTO: 279 10*3/MM3 (ref 140–450)
PMV BLD AUTO: 10 FL (ref 6–12)
POTASSIUM SERPL-SCNC: 4 MMOL/L (ref 3.5–5.2)
PROT SERPL-MCNC: 6.8 G/DL (ref 6–8.5)
RBC # BLD AUTO: 4.7 10*6/MM3 (ref 4.14–5.8)
SODIUM SERPL-SCNC: 141 MMOL/L (ref 136–145)
TSH SERPL DL<=0.05 MIU/L-ACNC: 1.06 UIU/ML (ref 0.27–4.2)
WBC # BLD AUTO: 12.96 10*3/MM3 (ref 3.4–10.8)
WHOLE BLOOD HOLD SPECIMEN: NORMAL

## 2021-06-21 PROCEDURE — 84481 FREE ASSAY (FT-3): CPT | Performed by: EMERGENCY MEDICINE

## 2021-06-21 PROCEDURE — 80307 DRUG TEST PRSMV CHEM ANLYZR: CPT | Performed by: EMERGENCY MEDICINE

## 2021-06-21 PROCEDURE — 85025 COMPLETE CBC W/AUTO DIFF WBC: CPT | Performed by: NURSE PRACTITIONER

## 2021-06-21 PROCEDURE — 80053 COMPREHEN METABOLIC PANEL: CPT | Performed by: NURSE PRACTITIONER

## 2021-06-21 PROCEDURE — 99283 EMERGENCY DEPT VISIT LOW MDM: CPT

## 2021-06-21 PROCEDURE — 82077 ASSAY SPEC XCP UR&BREATH IA: CPT | Performed by: EMERGENCY MEDICINE

## 2021-06-21 PROCEDURE — 84443 ASSAY THYROID STIM HORMONE: CPT | Performed by: EMERGENCY MEDICINE

## 2021-06-21 RX ORDER — HYDROXYZINE PAMOATE 50 MG/1
50 CAPSULE ORAL 3 TIMES DAILY PRN
Qty: 20 CAPSULE | Refills: 0 | Status: ON HOLD | OUTPATIENT
Start: 2021-06-21 | End: 2021-09-08

## 2021-06-21 RX ORDER — NICOTINE 21 MG/24HR
1 PATCH, TRANSDERMAL 24 HOURS TRANSDERMAL
Status: DISCONTINUED | OUTPATIENT
Start: 2021-06-21 | End: 2021-06-21 | Stop reason: HOSPADM

## 2021-06-21 NOTE — ED PROVIDER NOTES
"Subjective   Pt reports \"a low point\" and is feeling suicidal, denies specific plan. States he has seen a therapist at [facility] and was prescribed an antidepressant but he stopped taking it 3-4 months ago.       History provided by:  Patient and spouse  Suicidal  Presenting symptoms: depression and suicidal thoughts    Presenting symptoms: no self-mutilation    Patient accompanied by:  Family member  Degree of incapacity (severity):  Severe  Onset quality:  Gradual  Duration:  2 days  Timing:  Constant  Progression:  Worsening  Context: noncompliance (stopped taking antidepressant 3-4 months ago)    Treatment compliance:  Some of the time  Relieved by:  Nothing  Worsened by:  Nothing  Ineffective treatments:  Antidepressants  Associated symptoms: no abdominal pain, no chest pain and no headaches    Risk factors: no hx of suicide attempts        Review of Systems   Constitutional: Negative for chills and fever.   HENT: Negative for congestion, ear pain and sore throat.    Eyes: Negative for pain.   Respiratory: Negative for cough, chest tightness and shortness of breath.    Cardiovascular: Negative for chest pain.   Gastrointestinal: Negative for abdominal pain, diarrhea, nausea and vomiting.   Genitourinary: Negative for flank pain and hematuria.   Musculoskeletal: Negative for joint swelling.   Skin: Negative for pallor.   Neurological: Negative for seizures and headaches.   Psychiatric/Behavioral: Positive for suicidal ideas. Negative for confusion and self-injury.   All other systems reviewed and are negative.      Past Medical History:   Diagnosis Date   • Allergies    • Anxiety    • Bipolar disorder (CMS/HCC)    • Condition not found     Hernia   • Depression    • Eczema    • Forgetfulness    • Head injury    • Hypertension    • Migraine    • Sinus trouble    • SOB (shortness of breath)    • STD exposure        No Known Allergies    History reviewed. No pertinent surgical history.    Family History   Problem " Relation Age of Onset   • Breast cancer Mother    • Diabetes Father    • Diabetes Paternal Grandmother    • Diabetes Paternal Grandfather        Social History     Socioeconomic History   • Marital status:      Spouse name: Not on file   • Number of children: Not on file   • Years of education: Not on file   • Highest education level: Not on file   Tobacco Use   • Smoking status: Current Every Day Smoker     Packs/day: 1.00     Types: Cigarettes   • Smokeless tobacco: Never Used   • Tobacco comment: started smoking again in July 2017   Vaping Use   • Vaping Use: Never used   Substance and Sexual Activity   • Alcohol use: Yes     Comment: occ   • Drug use: Yes     Types: Marijuana     Comment: used this AM           Objective   Physical Exam  Vitals and nursing note reviewed.   Constitutional:       General: He is not in acute distress.     Appearance: Normal appearance. He is not toxic-appearing.   HENT:      Head: Normocephalic and atraumatic.      Mouth/Throat:      Mouth: Mucous membranes are moist.   Eyes:      Extraocular Movements: Extraocular movements intact.      Pupils: Pupils are equal, round, and reactive to light.   Cardiovascular:      Rate and Rhythm: Normal rate and regular rhythm.      Pulses: Normal pulses.      Heart sounds: Normal heart sounds.   Pulmonary:      Effort: Pulmonary effort is normal. No respiratory distress.      Breath sounds: Normal breath sounds.   Abdominal:      General: Abdomen is flat.      Palpations: Abdomen is soft.      Tenderness: There is no abdominal tenderness.   Musculoskeletal:         General: Normal range of motion.      Cervical back: Normal range of motion and neck supple.   Skin:     General: Skin is warm and dry.   Neurological:      Mental Status: He is alert and oriented to person, place, and time. Mental status is at baseline.         Procedures           ED Course  ED Course as of Jun 22 0034 Mon Jun 21, 2021   1750 Pt seen and evaluated per ER   Melody. She has arranged follow up with his therapist this week. She feels he can be managed on an outpatient basis since he does not have specific plan and has follow up soon.     [CM]      ED Course User Index  [CM] Benny Marquez APRN                                           MDM  Number of Diagnoses or Management Options     Amount and/or Complexity of Data Reviewed  Clinical lab tests: reviewed and ordered    Patient Progress  Patient progress: stable      Final diagnoses:   Severe episode of recurrent major depressive disorder, without psychotic features (CMS/HCC)   Anxiety       ED Disposition  ED Disposition     ED Disposition Condition Comment    Discharge Stable           Carlee Arroyo MD  [address]  [address]  [facility] [address]  [phone]    In 2 days  As needed    [facility] [place]  [address] [facility] [address]  [phone]  Go on 6/24/2021  appt time is 1:30         Medication List      New Prescriptions    hydrOXYzine pamoate 50 MG capsule  Commonly known as: VISTARIL  Take 1 capsule by mouth 3 (Three) Times a Day As Needed for Anxiety.           Where to Get Your Medications      These medications were sent to [facility], [place] - [address] [facility] [phone] [facility] [phone]   [address], [place] [address]    Phone: [phone]   · hydrOXYzine pamoate 50 MG capsule          Benny Marquez APRN  06/22/21 0034     Cardiac

## 2021-06-21 NOTE — DISCHARGE INSTRUCTIONS
Follow up with your therapist as arranged by the ER . Return for worsening symptoms or further thoughts of suicide.

## 2021-06-21 NOTE — ED TRIAGE NOTES
Pt wife states pt has had a lot of lows in the last few days and pt had verbalized suicidal thoughts and went missing for a few hours today. Pt admits to SI but doesn't have a specific plan but states he wants help.

## 2021-06-22 LAB — T3FREE SERPL-MCNC: 2.58 PG/ML (ref 2–4.4)

## 2021-06-22 NOTE — CASE MANAGEMENT/SOCIAL WORK
Pt agreed to a referral for medication management.     Please follow up as listed below.     [facility]  [address], [place]  P: [phone]    06/30/2021 11AMa

## 2021-07-08 ENCOUNTER — TELEPHONE (OUTPATIENT)
Dept: FAMILY MEDICINE CLINIC | Facility: CLINIC | Age: 41
End: 2021-07-08

## 2021-07-08 ENCOUNTER — TELEPHONE (OUTPATIENT)
Dept: UROLOGY | Facility: CLINIC | Age: 41
End: 2021-07-08

## 2021-07-08 NOTE — TELEPHONE ENCOUNTER
"[facility] HAS CALLED, REQUESTING THAT A \"PASSOORT REFERRAL\" BE FAXED ASA.    FAX NUMBER: [phone]    CALL BACK: [phone]  "

## 2021-07-08 NOTE — TELEPHONE ENCOUNTER
LVM FOR PATIENT AND AT MD OFFICE TO PLEASE SEND A REFERRAL FOR PASSPORT OR HAVE PATIENT BRING WITH HIM WHEN HE COMES FOR HIS APPOINTMENT.

## 2021-07-09 ENCOUNTER — OFFICE VISIT (OUTPATIENT)
Dept: UROLOGY | Facility: CLINIC | Age: 41
End: 2021-07-09

## 2021-07-09 VITALS — RESPIRATION RATE: 16 BRPM | WEIGHT: 189.6 LBS | BODY MASS INDEX: 31.59 KG/M2 | HEIGHT: 65 IN

## 2021-07-09 DIAGNOSIS — R31.21 ASYMPTOMATIC MICROSCOPIC HEMATURIA: Primary | ICD-10-CM

## 2021-07-09 PROBLEM — R06.02 SOB (SHORTNESS OF BREATH): Status: ACTIVE | Noted: 2021-07-09

## 2021-07-09 PROBLEM — G43.909 MIGRAINE: Status: ACTIVE | Noted: 2021-07-09

## 2021-07-09 PROBLEM — K46.9 ABDOMINAL HERNIA: Status: ACTIVE | Noted: 2021-07-09

## 2021-07-09 PROBLEM — F41.9 ANXIETY: Status: ACTIVE | Noted: 2021-07-09

## 2021-07-09 PROBLEM — Z20.2 STD EXPOSURE: Status: ACTIVE | Noted: 2021-07-09

## 2021-07-09 PROBLEM — I10 HYPERTENSION: Status: ACTIVE | Noted: 2021-07-09

## 2021-07-09 PROBLEM — S09.90XA HEAD INJURY: Status: ACTIVE | Noted: 2021-07-09

## 2021-07-09 PROBLEM — J01.80 OTHER ACUTE SINUSITIS: Status: ACTIVE | Noted: 2021-07-09

## 2021-07-09 PROBLEM — L30.9 ECZEMA: Status: ACTIVE | Noted: 2021-07-09

## 2021-07-09 PROBLEM — R68.89 FORGETFULNESS: Status: ACTIVE | Noted: 2021-07-09

## 2021-07-09 PROBLEM — F32.A DEPRESSION: Status: ACTIVE | Noted: 2021-07-09

## 2021-07-09 LAB
BILIRUB BLD-MCNC: NEGATIVE MG/DL
CLARITY, POC: CLEAR
COLOR UR: YELLOW
GLUCOSE UR STRIP-MCNC: NEGATIVE MG/DL
KETONES UR QL: ABNORMAL
LEUKOCYTE EST, POC: ABNORMAL
NITRITE UR-MCNC: NEGATIVE MG/ML
PH UR: 5.5 [PH] (ref 5–8)
PROT UR STRIP-MCNC: NEGATIVE MG/DL
RBC # UR STRIP: ABNORMAL /UL
SP GR UR: 1.02 (ref 1–1.03)
UROBILINOGEN UR QL: NORMAL

## 2021-07-09 PROCEDURE — 99213 OFFICE O/P EST LOW 20 MIN: CPT | Performed by: NURSE PRACTITIONER

## 2021-07-09 NOTE — PROGRESS NOTES
Chief Complaint: Blood in Urine (has seen no blood in urine)    Subjective         History of Present Illness     Darrion Anglin Jr. is a 41 y.o. male presents to [facility] to be seen for Microscopic hematuria.    Patient has been found to have microscopic hematuria on two separate occasions dating back to 4/2021    Patient has no urinary complaints at this point time.    CT scan of the abdomen pelvis with IV contrast performed on 4/9/2021 reveals kidney and adrenal glands and bladder are unremarkable.    Patient does have uncontrolled hypertension.  Patient is noncompliant with medication regimen for hypertension.    The patient also has a urine drug screen which is positive for cocaine.  We discussed this in office today and the patient admits occasional use.    The patient is a current every day smoker anywhere from half to a full pack a day for the last 5 years.    The patient has no family history of any  malignancy.    Objective     Past Medical History:   Diagnosis Date   • Allergies    • Anxiety    • Bipolar disorder (CMS/HCC)    • Condition not found     Hernia   • Depression    • Eczema    • Forgetfulness    • Head injury    • Hypertension    • Migraine    • Sinus trouble    • SOB (shortness of breath)    • STD exposure        History reviewed. No pertinent surgical history.      Current Outpatient Medications:   •  acetaminophen (TYLENOL) 500 MG tablet, Take 1 tablet by mouth Every 6 (Six) Hours As Needed for Mild Pain ., Disp: 30 tablet, Rfl: 0  •  diclofenac (VOLTAREN) 75 MG EC tablet, Take 1 tablet by mouth 2 (Two) Times a Day., Disp: 30 tablet, Rfl: 0  •  hydrOXYzine pamoate (VISTARIL) 50 MG capsule, Take 1 capsule by mouth 3 (Three) Times a Day As Needed for Anxiety., Disp: 20 capsule, Rfl: 0  •  methocarbamol (ROBAXIN) 500 MG tablet, Take 2 tablets by mouth 4 (Four) Times a Day., Disp: 40 tablet, Rfl: 0    No Known Allergies     Family History   Problem Relation Age of  "Onset   • Breast cancer Mother    • Diabetes Father    • Diabetes Paternal Grandmother    • Diabetes Paternal Grandfather        Social History     Socioeconomic History   • Marital status:      Spouse name: Not on file   • Number of children: Not on file   • Years of education: Not on file   • Highest education level: Not on file   Tobacco Use   • Smoking status: Current Every Day Smoker     Packs/day: 1.00     Types: Cigarettes   • Smokeless tobacco: Never Used   • Tobacco comment: started smoking again in 2017   Vaping Use   • Vaping Use: Never used   Substance and Sexual Activity   • Alcohol use: Yes     Comment: occ   • Drug use: Yes     Types: Marijuana     Comment: used this AM       Vital Signs:   Resp 16   Ht 165.1 cm (65\")   Wt 86 kg (189 lb 9.6 oz)   BMI 31.55 kg/m²      Physical Exam  HENT:      Head: Normocephalic and atraumatic.   Neurological:      Mental Status: He is alert.          Result Review :   The following data was reviewed by: KARLEE Mendoza on 2021:  Results for orders placed or performed in visit on 21   POC Urinalysis Dipstick, Automated    Specimen: Urine   Result Value Ref Range    Color Yellow Yellow, Straw, Dark Yellow, Melody    Clarity, UA Clear Clear    Specific Gravity  1.025 (A) 1.005 - 1.030    pH, Urine 5.5 5.0 - 8.0    Leukocytes Trace (A) Negative    Nitrite, UA Negative Negative    Protein, POC Negative Negative mg/dL    Glucose, UA Negative Negative, 1000 mg/dL (3+) mg/dL    Ketones, UA Trace (A) Negative    Urobilinogen, UA Normal Normal    Bilirubin Negative Negative    Blood, UA Moderate (A) Negative           Patient: ELY DUGAN     Acct: [id number]     Report: #[id number]  UNIT #: [id number]     DOS: 2021 1001     Order #: [id number]  Location:      : 1980  ORDERING: GERDA MEDINA  DICTATING: ANDREW CONRAD MD  REQ #: [id number]     EXAM: ABDPELW - CT ABDOMEN  PELVIS w CONTRAST  REASON FOR EXAM: Abdominal " Pain  REASON FOR VISIT: GROIN AREA ISSUES     PROCEDURE: CT ABDOMEN PELVIS WITH CONTRAST         COMPARISON: [facility], CT, ABDOMEN/PELVIS WITH CONTRAST, 8/24/2017, 6:45.         INDICATIONS: Abdominal Pain         TECHNIQUE: After obtaining the patient's consent, CT images were created with non-ionic intravenous     contrast material.           PROTOCOL:   Standard imaging protocol performed          RADIATION:   DLP: 677.8 mGy*cm      Automated exposure control was utilized to minimize radiation dose.     CONTRAST: 100 cc Isovue 370 I.V.    LABS:   eGFR: >60 ml/min/1.73m2         FINDINGS:     Abdomen:  Included lung bases are clear.         Liver, spleen, kidneys, adrenal glands, pancreas, gallbladder unremarkable.  Bowel loops are     nondilated.  Moderate colonic stool burden.         Pelvis:  No pelvic mass or fluid.  Six no aggressive appearing bone change.         CONCLUSION: No acute findings.  Constipation.          ANDREW CONRAD MD           Electronically Signed and Approved By: ANDREW CONRAD MD on 4/09/2021 at 11:33         Procedures        Assessment and Plan    Diagnoses and all orders for this visit:    1. Asymptomatic microscopic hematuria (Primary)  -     POC Urinalysis Dipstick, Automated      Discussed with patient that given he is with asymptomatic microscopic hematuria and has had upper urinary tract evaluation via contrasted CT scan he would still require a lower urinary tract evaluation via an office cystoscopy to rule out any lower urinary tract pathology.  Patient is agreeable to schedule cystoscopy with Dr. Randell Coats.  Did discuss with the patient that his cocaine use could be contributing to his elevated blood pressure which could in turn eventually cause renal damage and that he would need to discuss treatment options for his uncontrolled hypertension with his primary care provider and consider cessation of cocaine use.    I spent 20 minutes caring for Darrion on this  date of service. This time includes time spent by me in the following activities:reviewing tests, obtaining and/or reviewing a separately obtained history, performing a medically appropriate examination and/or evaluation , counseling and educating the patient/family/caregiver, ordering medications, tests, or procedures, and documenting information in the medical record    Follow Up   Return for With Dr. Coats For Cystoscopy.  Patient was given instructions and counseling regarding his condition or for health maintenance advice. Please see specific information pulled into the AVS if appropriate.

## 2021-08-04 DIAGNOSIS — R31.0 GROSS HEMATURIA: Primary | ICD-10-CM

## 2021-08-11 NOTE — TELEPHONE ENCOUNTER
CALLED PT -[phone]. LEFT VOICE MAIL AND EXPLAINED APPOINTMENT DATE, TIME AND LOCATION OF UROLOGY APPOINTMENT.

## 2021-08-22 PROBLEM — R31.29 MICROHEMATURIA: Status: ACTIVE | Noted: 2021-07-09

## 2021-08-22 NOTE — PROGRESS NOTES
Chief Complaint    Urologic complaint    Subjective          Darrion Anglin Jr. presents to [facility]  History of Present Illness    41 y.o. male presents to [facility] to be seen for gross hematuria     Patient saw gross hematuria back in April.  Asymptomatic.    No trauma.  No anticoagulation     Voiding without issue.  No burning or dysuria.     4/21 CT abdomen/pelvis with - negative, no delayed     Patient does use marijuana, history of cocaine not doing this currently per patient.      No history of kidney  stone.    No family history,   Has never had any urologic surgery.    No cardiopulmonary history.  Smoker patient does not use blood thinner.          Past History:  Medical History: has a past medical history of Allergies, Anxiety, Bipolar disorder (CMS/Formerly [facility]), Condition not found, Depression, Eczema, Forgetfulness, Head injury, Hypertension, Migraine, Sinus trouble, SOB (shortness of breath), and STD exposure.   Surgical History: has no past surgical history on file.   Family History: family history includes Breast cancer in his mother; Diabetes in his father, paternal grandfather, and paternal grandmother.   Social History: reports that he has been smoking cigarettes. He has been smoking about 1.00 pack per day. He has never used smokeless tobacco. He reports current alcohol use. He reports current drug use. Drug: Marijuana.  Allergies: Patient has no known allergies.       Current Outpatient Medications:   •  acetaminophen (TYLENOL) 500 MG tablet, Take 1 tablet by mouth Every 6 (Six) Hours As Needed for Mild Pain ., Disp: 30 tablet, Rfl: 0  •  busPIRone (BUSPAR) 5 MG tablet, , Disp: , Rfl:   •  cephalexin (KEFLEX) 500 MG capsule, Take 1 capsule by mouth 4 (Four) Times a Day., Disp: 40 capsule, Rfl: 0  •  clotrimazole (LOTRIMIN) 1 % cream, Apply  topically to the appropriate area as directed 2 (Two) Times a Day., Disp: 24 g, Rfl: 0  •  fluconazole  "(Diflucan) 100 MG tablet, Take 1 tablet by mouth Every Other Day., Disp: 10 tablet, Rfl: 0  •  hydrOXYzine pamoate (VISTARIL) 50 MG capsule, Take 1 capsule by mouth 3 (Three) Times a Day As Needed for Anxiety., Disp: 20 capsule, Rfl: 0  •  mupirocin (BACTROBAN) 2 % ointment, Apply  topically to the appropriate area as directed 3 (Three) Times a Day., Disp: 22 g, Rfl: 0  •  naproxen (Naprosyn) 500 MG tablet, Take 1 tablet by mouth 2 (Two) Times a Day With Meals., Disp: 20 tablet, Rfl: 0  •  sertraline (ZOLOFT) 50 MG tablet, , Disp: , Rfl:   •  traZODone (DESYREL) 50 MG tablet, , Disp: , Rfl:      Physical exam       Alert and orient x3  Well appearing, well developed, in no acute distress   Unlabored respirations  Nontender/nondistended  CTAB  RRR    Grossly oriented to person, place and time, judgment is intact, normal mood and affect    Results for orders placed or performed in visit on 07/09/21   POC Urinalysis Dipstick, Automated    Specimen: Urine   Result Value Ref Range    Color Yellow Yellow, Straw, Dark Yellow, Melody    Clarity, UA Clear Clear    Specific Gravity  1.025 (A) 1.005 - 1.030    pH, Urine 5.5 5.0 - 8.0    Leukocytes Trace (A) Negative    Nitrite, UA Negative Negative    Protein, POC Negative Negative mg/dL    Glucose, UA Negative Negative, 1000 mg/dL (3+) mg/dL    Ketones, UA Trace (A) Negative    Urobilinogen, UA Normal Normal    Bilirubin Negative Negative    Blood, UA Moderate (A) Negative        Objective     Vital Signs:   Resp 17   Ht 165.1 cm (65\")   Wt 86.2 kg (190 lb)   BMI 31.62 kg/m²              Assessment and Plan    Diagnoses and all orders for this visit:    1. Microhematuria (Primary)      Patient had a CT without delayed phase after discussion of risk benefits we will get him set up for cystoscopy of bilateral retrograde pyelograms risks and benefits were discussed including bleeding, infection and damage to the urinary system.  We also discussed the risk of anesthesia up to " and including death.  Patient voiced understanding and would like to proceed.    We discussed failure to do this work-up could risk missing a urologic malignancy which should be detrimental to his health or cause death.  Patient voiced understanding

## 2021-08-24 ENCOUNTER — OFFICE VISIT (OUTPATIENT)
Dept: UROLOGY | Facility: CLINIC | Age: 41
End: 2021-08-24

## 2021-08-24 ENCOUNTER — PREP FOR SURGERY (OUTPATIENT)
Dept: OTHER | Facility: HOSPITAL | Age: 41
End: 2021-08-24

## 2021-08-24 VITALS — WEIGHT: 190 LBS | HEIGHT: 65 IN | RESPIRATION RATE: 17 BRPM | BODY MASS INDEX: 31.65 KG/M2

## 2021-08-24 DIAGNOSIS — R31.0 GROSS HEMATURIA: Primary | ICD-10-CM

## 2021-08-24 DIAGNOSIS — R31.29 MICROHEMATURIA: Primary | ICD-10-CM

## 2021-08-24 PROCEDURE — 99213 OFFICE O/P EST LOW 20 MIN: CPT | Performed by: UROLOGY

## 2021-08-24 PROCEDURE — 87086 URINE CULTURE/COLONY COUNT: CPT | Performed by: UROLOGY

## 2021-08-24 RX ORDER — SODIUM CHLORIDE 0.9 % (FLUSH) 0.9 %
3 SYRINGE (ML) INJECTION EVERY 12 HOURS SCHEDULED
Status: CANCELLED | OUTPATIENT
Start: 2021-08-24

## 2021-08-24 RX ORDER — SODIUM CHLORIDE 0.9 % (FLUSH) 0.9 %
10 SYRINGE (ML) INJECTION AS NEEDED
Status: CANCELLED | OUTPATIENT
Start: 2021-08-24

## 2021-08-24 RX ORDER — SODIUM CHLORIDE 9 MG/ML
100 INJECTION, SOLUTION INTRAVENOUS CONTINUOUS
Status: CANCELLED | OUTPATIENT
Start: 2021-08-24

## 2021-08-24 RX ORDER — LEVOFLOXACIN 5 MG/ML
500 INJECTION, SOLUTION INTRAVENOUS ONCE
Status: CANCELLED | OUTPATIENT
Start: 2021-08-24 | End: 2021-08-24

## 2021-08-24 NOTE — H&P
Chief Complaint    Urologic complaint    Subjective          Darrion Anglin Jr. presents to [facility] LUCY ORDERS ONLY  History of Present Illness    41 y.o. male presents to [facility] UROLOG[facility] to be seen for gross hematuria     Patient saw gross hematuria back in April.  Asymptomatic.    No trauma.  No anticoagulation     Voiding without issue.  No burning or dysuria.     4/21 CT abdomen/pelvis with - negative, no delayed     Patient does use marijuana, history of cocaine not doing this currently per patient.      No history of kidney  stone.    No family history,   Has never had any urologic surgery.    No cardiopulmonary history.  Smoker patient does not use blood thinner.          Past History:  Medical History: has a past medical history of Allergies, Anxiety, Bipolar disorder (CMS/Formerly [facility]), Condition not found, Depression, Eczema, Forgetfulness, Head injury, Hypertension, Migraine, Sinus trouble, SOB (shortness of breath), and STD exposure.   Surgical History: has no past surgical history on file.   Family History: family history includes Breast cancer in his mother; Diabetes in his father, paternal grandfather, and paternal grandmother.   Social History: reports that he has been smoking cigarettes. He has been smoking about 1.00 pack per day. He has never used smokeless tobacco. He reports current alcohol use. He reports current drug use. Drug: Marijuana.  Allergies: Patient has no known allergies.       Current Outpatient Medications:   •  acetaminophen (TYLENOL) 500 MG tablet, Take 1 tablet by mouth Every 6 (Six) Hours As Needed for Mild Pain ., Disp: 30 tablet, Rfl: 0  •  busPIRone (BUSPAR) 5 MG tablet, , Disp: , Rfl:   •  cephalexin (KEFLEX) 500 MG capsule, Take 1 capsule by mouth 4 (Four) Times a Day., Disp: 40 capsule, Rfl: 0  •  clotrimazole (LOTRIMIN) 1 % cream, Apply  topically to the appropriate area as directed 2 (Two) Times a Day., Disp: 24 g, Rfl: 0  •  fluconazole (Diflucan) 100 MG  tablet, Take 1 tablet by mouth Every Other Day., Disp: 10 tablet, Rfl: 0  •  hydrOXYzine pamoate (VISTARIL) 50 MG capsule, Take 1 capsule by mouth 3 (Three) Times a Day As Needed for Anxiety., Disp: 20 capsule, Rfl: 0  •  mupirocin (BACTROBAN) 2 % ointment, Apply  topically to the appropriate area as directed 3 (Three) Times a Day., Disp: 22 g, Rfl: 0  •  naproxen (Naprosyn) 500 MG tablet, Take 1 tablet by mouth 2 (Two) Times a Day With Meals., Disp: 20 tablet, Rfl: 0  •  sertraline (ZOLOFT) 50 MG tablet, , Disp: , Rfl:   •  traZODone (DESYREL) 50 MG tablet, , Disp: , Rfl:      Physical exam       Alert and orient x3  Well appearing, well developed, in no acute distress   Unlabored respirations  Nontender/nondistended  CTAB  RRR    Grossly oriented to person, place and time, judgment is intact, normal mood and affect    Results for orders placed or performed in visit on 07/09/21   POC Urinalysis Dipstick, Automated    Specimen: Urine   Result Value Ref Range    Color Yellow Yellow, Straw, Dark Yellow, Melody    Clarity, UA Clear Clear    Specific Gravity  1.025 (A) 1.005 - 1.030    pH, Urine 5.5 5.0 - 8.0    Leukocytes Trace (A) Negative    Nitrite, UA Negative Negative    Protein, POC Negative Negative mg/dL    Glucose, UA Negative Negative, 1000 mg/dL (3+) mg/dL    Ketones, UA Trace (A) Negative    Urobilinogen, UA Normal Normal    Bilirubin Negative Negative    Blood, UA Moderate (A) Negative        Objective     Vital Signs:   There were no vitals taken for this visit.             Assessment and Plan    Diagnoses and all orders for this visit:    1. Gross hematuria (Primary)  -     Case Request; Standing  -     COVID PRE-OP / PRE-PROCEDURE SCREENING ORDER (NO ISOLATION) - Swab, Nasopharynx; Future  -     sodium chloride 0.9 % infusion  -     levoFLOXacin (LEVAQUIN) 500 mg/100 mL D5W (premix) (LEVAQUIN) 500 mg  -     sodium chloride 0.9 % flush 3 mL  -     sodium chloride 0.9 % flush 10 mL  -     Case  Request    Other orders  -     Outpatient In A Bed; Standing  -     Follow Anesthesia Guidelines / Protocol; Future  -     Provide NPO Instructions to Patient; Future  -     Follow Anesthesia Guidelines / Protocol; Standing  -     Obtain Informed Consent; Standing  -     Insert Peripheral IV; Standing  -     Saline Lock & Maintain IV Access; Standing      Patient had a CT without delayed phase after discussion of risk benefits we will get him set up for cystoscopy of bilateral retrograde pyelograms risks and benefits were discussed including bleeding, infection and damage to the urinary system.  We also discussed the risk of anesthesia up to and including death.  Patient voiced understanding and would like to proceed.

## 2021-08-25 LAB — BACTERIA SPEC AEROBE CULT: NO GROWTH

## 2021-08-30 ENCOUNTER — TRANSCRIBE ORDERS (OUTPATIENT)
Dept: LAB | Facility: HOSPITAL | Age: 41
End: 2021-08-30

## 2021-08-30 DIAGNOSIS — Z99.2: Primary | ICD-10-CM

## 2021-08-30 DIAGNOSIS — Z01.818 PRE-OP TESTING: ICD-10-CM

## 2021-08-31 ENCOUNTER — TRANSCRIBE ORDERS (OUTPATIENT)
Dept: LAB | Facility: HOSPITAL | Age: 41
End: 2021-08-31

## 2021-08-31 DIAGNOSIS — Z01.818 PREOP TESTING: Primary | ICD-10-CM

## 2021-09-03 ENCOUNTER — LAB (OUTPATIENT)
Dept: LAB | Facility: HOSPITAL | Age: 41
End: 2021-09-03

## 2021-09-03 DIAGNOSIS — Z01.818 PREOP TESTING: Primary | ICD-10-CM

## 2021-09-03 PROCEDURE — U0004 COV-19 TEST NON-CDC HGH THRU: HCPCS

## 2021-09-03 PROCEDURE — C9803 HOPD COVID-19 SPEC COLLECT: HCPCS

## 2021-09-04 LAB — SARS-COV-2 RNA NOSE QL NAA+PROBE: NOT DETECTED

## 2021-09-08 ENCOUNTER — ANESTHESIA (OUTPATIENT)
Dept: PERIOP | Facility: HOSPITAL | Age: 41
End: 2021-09-08

## 2021-09-08 ENCOUNTER — TELEPHONE (OUTPATIENT)
Dept: UROLOGY | Facility: CLINIC | Age: 41
End: 2021-09-08

## 2021-09-08 ENCOUNTER — HOSPITAL ENCOUNTER (OUTPATIENT)
Facility: HOSPITAL | Age: 41
Discharge: HOME | End: 2021-09-08
Attending: UROLOGY | Admitting: UROLOGY

## 2021-09-08 ENCOUNTER — APPOINTMENT (OUTPATIENT)
Dept: GENERAL RADIOLOGY | Facility: HOSPITAL | Age: 41
End: 2021-09-08

## 2021-09-08 ENCOUNTER — ANESTHESIA EVENT (OUTPATIENT)
Dept: PERIOP | Facility: HOSPITAL | Age: 41
End: 2021-09-08

## 2021-09-08 VITALS
OXYGEN SATURATION: 100 % | WEIGHT: 190.7 LBS | RESPIRATION RATE: 16 BRPM | HEART RATE: 66 BPM | SYSTOLIC BLOOD PRESSURE: 142 MMHG | HEIGHT: 65 IN | DIASTOLIC BLOOD PRESSURE: 88 MMHG | TEMPERATURE: 97.6 F | BODY MASS INDEX: 31.77 KG/M2

## 2021-09-08 DIAGNOSIS — R31.0 GROSS HEMATURIA: ICD-10-CM

## 2021-09-08 PROCEDURE — 25010000002 FENTANYL CITRATE (PF) 50 MCG/ML SOLUTION: Performed by: NURSE ANESTHETIST, CERTIFIED REGISTERED

## 2021-09-08 PROCEDURE — 25010000002 MIDAZOLAM PER 1MG: Performed by: ANESTHESIOLOGY

## 2021-09-08 PROCEDURE — 0 IOPAMIDOL PER 1 ML: Performed by: UROLOGY

## 2021-09-08 PROCEDURE — 25010000002 LEVOFLOXACIN PER 250 MG: Performed by: UROLOGY

## 2021-09-08 PROCEDURE — 25010000002 ONDANSETRON PER 1 MG: Performed by: NURSE ANESTHETIST, CERTIFIED REGISTERED

## 2021-09-08 PROCEDURE — 74420 UROGRAPHY RTRGR +-KUB: CPT

## 2021-09-08 PROCEDURE — 52005 CYSTO W/URTRL CATHJ: CPT | Performed by: UROLOGY

## 2021-09-08 PROCEDURE — C1758 CATHETER, URETERAL: HCPCS | Performed by: UROLOGY

## 2021-09-08 PROCEDURE — 25010000002 PROPOFOL 10 MG/ML EMULSION: Performed by: NURSE ANESTHETIST, CERTIFIED REGISTERED

## 2021-09-08 RX ORDER — SODIUM CHLORIDE, SODIUM LACTATE, POTASSIUM CHLORIDE, CALCIUM CHLORIDE 600; 310; 30; 20 MG/100ML; MG/100ML; MG/100ML; MG/100ML
9 INJECTION, SOLUTION INTRAVENOUS CONTINUOUS PRN
Status: DISCONTINUED | OUTPATIENT
Start: 2021-09-08 | End: 2021-09-08 | Stop reason: HOSPADM

## 2021-09-08 RX ORDER — MAGNESIUM HYDROXIDE 1200 MG/15ML
LIQUID ORAL AS NEEDED
Status: DISCONTINUED | OUTPATIENT
Start: 2021-09-08 | End: 2021-09-08 | Stop reason: HOSPADM

## 2021-09-08 RX ORDER — MIDAZOLAM HYDROCHLORIDE 2 MG/2ML
2 INJECTION, SOLUTION INTRAMUSCULAR; INTRAVENOUS ONCE
Status: DISCONTINUED | OUTPATIENT
Start: 2021-09-08 | End: 2021-09-08

## 2021-09-08 RX ORDER — PROMETHAZINE HYDROCHLORIDE 12.5 MG/1
25 TABLET ORAL ONCE AS NEEDED
Status: DISCONTINUED | OUTPATIENT
Start: 2021-09-08 | End: 2021-09-08 | Stop reason: SDUPTHER

## 2021-09-08 RX ORDER — ONDANSETRON 2 MG/ML
4 INJECTION INTRAMUSCULAR; INTRAVENOUS ONCE AS NEEDED
Status: DISCONTINUED | OUTPATIENT
Start: 2021-09-08 | End: 2021-09-08 | Stop reason: SDUPTHER

## 2021-09-08 RX ORDER — ACETAMINOPHEN 500 MG
1000 TABLET ORAL ONCE
Status: COMPLETED | OUTPATIENT
Start: 2021-09-08 | End: 2021-09-08

## 2021-09-08 RX ORDER — ACETAMINOPHEN 325 MG/1
650 TABLET ORAL ONCE
Status: DISCONTINUED | OUTPATIENT
Start: 2021-09-08 | End: 2021-09-08 | Stop reason: HOSPADM

## 2021-09-08 RX ORDER — ONDANSETRON 2 MG/ML
INJECTION INTRAMUSCULAR; INTRAVENOUS AS NEEDED
Status: DISCONTINUED | OUTPATIENT
Start: 2021-09-08 | End: 2021-09-08 | Stop reason: SURG

## 2021-09-08 RX ORDER — ONDANSETRON 4 MG/1
4 TABLET, FILM COATED ORAL ONCE AS NEEDED
Status: DISCONTINUED | OUTPATIENT
Start: 2021-09-08 | End: 2021-09-08 | Stop reason: HOSPADM

## 2021-09-08 RX ORDER — SODIUM CHLORIDE 0.9 % (FLUSH) 0.9 %
3 SYRINGE (ML) INJECTION EVERY 12 HOURS SCHEDULED
Status: DISCONTINUED | OUTPATIENT
Start: 2021-09-08 | End: 2021-09-08 | Stop reason: HOSPADM

## 2021-09-08 RX ORDER — OXYCODONE HYDROCHLORIDE 5 MG/1
5 TABLET ORAL
Status: DISCONTINUED | OUTPATIENT
Start: 2021-09-08 | End: 2021-09-08 | Stop reason: HOSPADM

## 2021-09-08 RX ORDER — PHENAZOPYRIDINE HYDROCHLORIDE 100 MG/1
100 TABLET, FILM COATED ORAL 3 TIMES DAILY PRN
Qty: 20 TABLET | Refills: 0 | Status: SHIPPED | OUTPATIENT
Start: 2021-09-08 | End: 2021-10-02

## 2021-09-08 RX ORDER — LEVOFLOXACIN 5 MG/ML
500 INJECTION, SOLUTION INTRAVENOUS ONCE
Status: COMPLETED | OUTPATIENT
Start: 2021-09-08 | End: 2021-09-08

## 2021-09-08 RX ORDER — ONDANSETRON 2 MG/ML
4 INJECTION INTRAMUSCULAR; INTRAVENOUS ONCE AS NEEDED
Status: DISCONTINUED | OUTPATIENT
Start: 2021-09-08 | End: 2021-09-08 | Stop reason: HOSPADM

## 2021-09-08 RX ORDER — OXYCODONE HYDROCHLORIDE 5 MG/1
5 TABLET ORAL
Status: DISCONTINUED | OUTPATIENT
Start: 2021-09-08 | End: 2021-09-08 | Stop reason: SDUPTHER

## 2021-09-08 RX ORDER — PROMETHAZINE HYDROCHLORIDE 25 MG/1
25 SUPPOSITORY RECTAL ONCE AS NEEDED
Status: DISCONTINUED | OUTPATIENT
Start: 2021-09-08 | End: 2021-09-08 | Stop reason: SDUPTHER

## 2021-09-08 RX ORDER — PROMETHAZINE HYDROCHLORIDE 25 MG/1
25 SUPPOSITORY RECTAL ONCE AS NEEDED
Status: DISCONTINUED | OUTPATIENT
Start: 2021-09-08 | End: 2021-09-08 | Stop reason: HOSPADM

## 2021-09-08 RX ORDER — LIDOCAINE HYDROCHLORIDE 20 MG/ML
INJECTION, SOLUTION INFILTRATION; PERINEURAL AS NEEDED
Status: DISCONTINUED | OUTPATIENT
Start: 2021-09-08 | End: 2021-09-08 | Stop reason: SURG

## 2021-09-08 RX ORDER — SODIUM CHLORIDE 0.9 % (FLUSH) 0.9 %
10 SYRINGE (ML) INJECTION AS NEEDED
Status: DISCONTINUED | OUTPATIENT
Start: 2021-09-08 | End: 2021-09-08 | Stop reason: HOSPADM

## 2021-09-08 RX ORDER — SODIUM CHLORIDE 9 MG/ML
100 INJECTION, SOLUTION INTRAVENOUS CONTINUOUS
Status: DISCONTINUED | OUTPATIENT
Start: 2021-09-08 | End: 2021-09-08 | Stop reason: HOSPADM

## 2021-09-08 RX ORDER — FENTANYL CITRATE 50 UG/ML
INJECTION, SOLUTION INTRAMUSCULAR; INTRAVENOUS AS NEEDED
Status: DISCONTINUED | OUTPATIENT
Start: 2021-09-08 | End: 2021-09-08 | Stop reason: SURG

## 2021-09-08 RX ORDER — PROMETHAZINE HYDROCHLORIDE 12.5 MG/1
25 TABLET ORAL ONCE AS NEEDED
Status: DISCONTINUED | OUTPATIENT
Start: 2021-09-08 | End: 2021-09-08 | Stop reason: HOSPADM

## 2021-09-08 RX ORDER — KETAMINE HYDROCHLORIDE 50 MG/ML
INJECTION, SOLUTION, CONCENTRATE INTRAMUSCULAR; INTRAVENOUS AS NEEDED
Status: DISCONTINUED | OUTPATIENT
Start: 2021-09-08 | End: 2021-09-08 | Stop reason: SURG

## 2021-09-08 RX ORDER — MIDAZOLAM HYDROCHLORIDE 2 MG/2ML
2 INJECTION, SOLUTION INTRAMUSCULAR; INTRAVENOUS ONCE
Status: COMPLETED | OUTPATIENT
Start: 2021-09-08 | End: 2021-09-08

## 2021-09-08 RX ORDER — MEPERIDINE HYDROCHLORIDE 25 MG/ML
12.5 INJECTION INTRAMUSCULAR; INTRAVENOUS; SUBCUTANEOUS
Status: DISCONTINUED | OUTPATIENT
Start: 2021-09-08 | End: 2021-09-08 | Stop reason: SDUPTHER

## 2021-09-08 RX ORDER — DEXMEDETOMIDINE HYDROCHLORIDE 100 UG/ML
INJECTION, SOLUTION INTRAVENOUS AS NEEDED
Status: DISCONTINUED | OUTPATIENT
Start: 2021-09-08 | End: 2021-09-08 | Stop reason: SURG

## 2021-09-08 RX ORDER — MEPERIDINE HYDROCHLORIDE 25 MG/ML
12.5 INJECTION INTRAMUSCULAR; INTRAVENOUS; SUBCUTANEOUS
Status: DISCONTINUED | OUTPATIENT
Start: 2021-09-08 | End: 2021-09-08 | Stop reason: HOSPADM

## 2021-09-08 RX ORDER — PROPOFOL 10 MG/ML
VIAL (ML) INTRAVENOUS AS NEEDED
Status: DISCONTINUED | OUTPATIENT
Start: 2021-09-08 | End: 2021-09-08 | Stop reason: SURG

## 2021-09-08 RX ORDER — PROMETHAZINE HYDROCHLORIDE 12.5 MG/1
12.5 TABLET ORAL ONCE AS NEEDED
Status: DISCONTINUED | OUTPATIENT
Start: 2021-09-08 | End: 2021-09-08 | Stop reason: HOSPADM

## 2021-09-08 RX ADMIN — KETAMINE HYDROCHLORIDE 50 MG: 50 INJECTION, SOLUTION INTRAMUSCULAR; INTRAVENOUS at 12:41

## 2021-09-08 RX ADMIN — SODIUM CHLORIDE, POTASSIUM CHLORIDE, SODIUM LACTATE AND CALCIUM CHLORIDE 9 ML/HR: 600; 310; 30; 20 INJECTION, SOLUTION INTRAVENOUS at 10:51

## 2021-09-08 RX ADMIN — ONDANSETRON 4 MG: 2 INJECTION INTRAMUSCULAR; INTRAVENOUS at 12:16

## 2021-09-08 RX ADMIN — MIDAZOLAM HYDROCHLORIDE 2 MG: 1 INJECTION, SOLUTION INTRAMUSCULAR; INTRAVENOUS at 11:57

## 2021-09-08 RX ADMIN — PROPOFOL 200 MCG/KG/MIN: 10 INJECTION, EMULSION INTRAVENOUS at 12:22

## 2021-09-08 RX ADMIN — ACETAMINOPHEN 1000 MG: 500 TABLET ORAL at 10:51

## 2021-09-08 RX ADMIN — FENTANYL CITRATE 50 MCG: 50 INJECTION INTRAMUSCULAR; INTRAVENOUS at 12:16

## 2021-09-08 RX ADMIN — DEXMEDETOMIDINE HYDROCHLORIDE 40 MCG: 100 INJECTION, SOLUTION INTRAVENOUS at 12:19

## 2021-09-08 RX ADMIN — LIDOCAINE HYDROCHLORIDE 100 MG: 20 INJECTION, SOLUTION INFILTRATION; PERINEURAL at 12:22

## 2021-09-08 RX ADMIN — LEVOFLOXACIN 500 MG: 5 INJECTION, SOLUTION INTRAVENOUS at 12:17

## 2021-09-08 RX ADMIN — MIDAZOLAM HYDROCHLORIDE 2 MG: 1 INJECTION, SOLUTION INTRAMUSCULAR; INTRAVENOUS at 10:51

## 2021-09-08 RX ADMIN — FENTANYL CITRATE 50 MCG: 50 INJECTION INTRAMUSCULAR; INTRAVENOUS at 12:32

## 2021-09-08 RX ADMIN — PROPOFOL 60 MG: 10 INJECTION, EMULSION INTRAVENOUS at 12:22

## 2021-09-08 RX ADMIN — OXYCODONE HYDROCHLORIDE 5 MG: 5 TABLET ORAL at 13:22

## 2021-09-08 NOTE — OP NOTE
CYSTOSCOPY RETROGRADE PYELOGRAM  Procedure Report    Patient Name:  Darrion Anglin Jr.  YOB: 1980    Date of Surgery:  9/8/2021      Pre-op Diagnosis:   Gross hematuria [R31.0]       Postop diagnosis:    Same    Procedure/CPT® Codes:      Procedure(s):  Cystoscopy with bilateral retrograde pyelograms    Staff:  Surgeon(s):  Randell Coats MD         Anesthesia: Monitored Anesthesia Care    Estimated Blood Loss: 0 mL    Implants:    Nothing was implanted during the procedure    Specimen:          None        Findings:     Normal cystoscopy  Normal bilateral retrograde pyelograms    Complications: none    Description of Procedure:   After informed consent patient was taken to the operating room.  Patient placed supine and placed under monitored anesthesia care by the anesthesia team.  Patient then placed in dorsal lithotomy position.  Prepped and draped in normal sterile fashion.  A multidisciplinary timeout was undertaken document the correct patient site and procedure.    22 rigid cystoscope was placed into urethra.  Anterior urethra was normal.  Prostatic urethra showed        2    cm prostate.  Bladder was examined systematically.  No pathology was seen.      There was some hypervascularity but no signs of pathology    Bilateral ureteral orifices were in normal anatomic location.  Retrograde pyelograms were undertaken starting on the left with a cone-tip catheter.  No filling defects, no hydronephrosis and drained well.  Also did the same thing on the right side.  No filling defects, no hydronephrosis and drained well.    Patient tolerated procedure well.  Bladder was drained and the procedure was ended.  Patient was awakened and taken to the postanesthesia care unit without problem.          Randell Coats MD     Date: 9/8/2021  Time: 12:43 EDT

## 2021-09-08 NOTE — ANESTHESIA PREPROCEDURE EVALUATION
Anesthesia Evaluation     Patient summary reviewed and Nursing notes reviewed                Airway   Mallampati: I  TM distance: >3 FB  Neck ROM: full  No difficulty expected  Dental          Pulmonary - negative pulmonary ROS and normal exam    breath sounds clear to auscultation  Cardiovascular - normal exam    Rhythm: regular    (+) hypertension,       Neuro/Psych- negative ROS  GI/Hepatic/Renal/Endo - negative ROS     Musculoskeletal (-) negative ROS    Abdominal    Substance History - negative use     OB/GYN negative ob/gyn ROS         Other                        Anesthesia Plan    ASA 2     general     intravenous induction     Anesthetic plan, all risks, benefits, and alternatives have been provided, discussed and informed consent has been obtained with: patient.

## 2021-09-08 NOTE — ANESTHESIA POSTPROCEDURE EVALUATION
Patient: Darrion Anglin Jr.    Procedure Summary     Date: 09/08/21 Room / Location: [facility] OR 07 / [facility] MAIN OR    Anesthesia Start: 1216 Anesthesia Stop: 1253    Procedure: Cystoscopy with bilateral retrograde pyelograms (Bilateral Ureter) Diagnosis:       Gross hematuria      (Gross hematuria [R31.0])    Surgeons: Randell Coats MD Provider: Cristobal Peoples MD    Anesthesia Type: general ASA Status: 2          Anesthesia Type: general    Vitals  Vitals Value Taken Time   /70 09/08/21 1307   Temp 36.4 °C (97.5 °F) 09/08/21 1251   Pulse 67 09/08/21 1310   Resp 20 09/08/21 1301   SpO2 100 % 09/08/21 1310   Vitals shown include unvalidated device data.        Post Anesthesia Care and Evaluation    Patient location during evaluation: bedside  Patient participation: complete - patient participated  Level of consciousness: awake  Pain management: adequate  Airway patency: patent  Anesthetic complications: No anesthetic complications  PONV Status: none  Cardiovascular status: acceptable and stable  Respiratory status: acceptable  Hydration status: acceptable    Comments: An Anesthesiologist personally participated in the most demanding procedures (including induction and emergence if applicable) in the anesthesia plan, monitored the course of anesthesia administration at frequent intervals and remained physically present and available for immediate diagnosis and treatment of emergencies.

## 2021-09-09 ENCOUNTER — TELEPHONE (OUTPATIENT)
Dept: UROLOGY | Facility: CLINIC | Age: 41
End: 2021-09-09

## 2021-09-09 DIAGNOSIS — R30.0 DYSURIA: Primary | ICD-10-CM

## 2021-09-09 NOTE — TELEPHONE ENCOUNTER
Cysto bilateral retrograde pyelograms 09/08.  Pyridium and Tylenol not helping for pain.  Constant pain in groin.

## 2021-09-10 NOTE — TELEPHONE ENCOUNTER
Spoke to patient.  He said there is burning with urination.  Advised we will send him for a urine culture and call him back with the results.  Patient agrees.

## 2021-09-11 ENCOUNTER — LAB (OUTPATIENT)
Dept: LAB | Facility: HOSPITAL | Age: 41
End: 2021-09-11

## 2021-09-11 PROCEDURE — 87086 URINE CULTURE/COLONY COUNT: CPT | Performed by: UROLOGY

## 2021-09-12 LAB — BACTERIA SPEC AEROBE CULT: NO GROWTH

## 2021-10-03 ENCOUNTER — APPOINTMENT (OUTPATIENT)
Dept: GENERAL RADIOLOGY | Facility: HOSPITAL | Age: 41
End: 2021-10-03

## 2021-10-03 ENCOUNTER — HOSPITAL ENCOUNTER (EMERGENCY)
Facility: HOSPITAL | Age: 41
Discharge: HOME | End: 2021-10-03
Attending: EMERGENCY MEDICINE | Admitting: EMERGENCY MEDICINE

## 2021-10-03 VITALS
DIASTOLIC BLOOD PRESSURE: 97 MMHG | HEIGHT: 65 IN | TEMPERATURE: 98.2 F | OXYGEN SATURATION: 100 % | WEIGHT: 185.19 LBS | RESPIRATION RATE: 16 BRPM | SYSTOLIC BLOOD PRESSURE: 132 MMHG | BODY MASS INDEX: 30.85 KG/M2 | HEART RATE: 58 BPM

## 2021-10-03 DIAGNOSIS — R07.9 CHEST PAIN OF UNKNOWN ETIOLOGY: Primary | ICD-10-CM

## 2021-10-03 DIAGNOSIS — M62.82 NON-TRAUMATIC RHABDOMYOLYSIS: ICD-10-CM

## 2021-10-03 LAB
ALBUMIN SERPL-MCNC: 4.5 G/DL (ref 3.5–5.2)
ALBUMIN/GLOB SERPL: 2 G/DL
ALP SERPL-CCNC: 56 U/L (ref 39–117)
ALT SERPL W P-5'-P-CCNC: 22 U/L (ref 1–41)
ANION GAP SERPL CALCULATED.3IONS-SCNC: 9.7 MMOL/L (ref 5–15)
AST SERPL-CCNC: 25 U/L (ref 1–40)
BASOPHILS # BLD AUTO: 0.01 10*3/MM3 (ref 0–0.2)
BASOPHILS NFR BLD AUTO: 0.1 % (ref 0–1.5)
BILIRUB SERPL-MCNC: 1 MG/DL (ref 0–1.2)
BUN SERPL-MCNC: 11 MG/DL (ref 6–20)
BUN/CREAT SERPL: 10.2 (ref 7–25)
CALCIUM SPEC-SCNC: 9 MG/DL (ref 8.6–10.5)
CHLORIDE SERPL-SCNC: 101 MMOL/L (ref 98–107)
CK MB SERPL-CCNC: 7.97 NG/ML
CK SERPL-CCNC: 976 U/L (ref 20–200)
CO2 SERPL-SCNC: 26.3 MMOL/L (ref 22–29)
CREAT SERPL-MCNC: 1.08 MG/DL (ref 0.76–1.27)
DEPRECATED RDW RBC AUTO: 45.4 FL (ref 37–54)
EOSINOPHIL # BLD AUTO: 0.16 10*3/MM3 (ref 0–0.4)
EOSINOPHIL NFR BLD AUTO: 1.5 % (ref 0.3–6.2)
ERYTHROCYTE [DISTWIDTH] IN BLOOD BY AUTOMATED COUNT: 13 % (ref 12.3–15.4)
GFR SERPL CREATININE-BSD FRML MDRD: 91 ML/MIN/1.73
GLOBULIN UR ELPH-MCNC: 2.2 GM/DL
GLUCOSE SERPL-MCNC: 95 MG/DL (ref 65–99)
HCT VFR BLD AUTO: 42.4 % (ref 37.5–51)
HGB BLD-MCNC: 14.4 G/DL (ref 13–17.7)
HOLD SPECIMEN: NORMAL
HOLD SPECIMEN: NORMAL
IMM GRANULOCYTES # BLD AUTO: 0.02 10*3/MM3 (ref 0–0.05)
IMM GRANULOCYTES NFR BLD AUTO: 0.2 % (ref 0–0.5)
LIPASE SERPL-CCNC: 24 U/L (ref 13–60)
LYMPHOCYTES # BLD AUTO: 1.66 10*3/MM3 (ref 0.7–3.1)
LYMPHOCYTES NFR BLD AUTO: 15.2 % (ref 19.6–45.3)
MAGNESIUM SERPL-MCNC: 2.2 MG/DL (ref 1.6–2.6)
MCH RBC QN AUTO: 32.1 PG (ref 26.6–33)
MCHC RBC AUTO-ENTMCNC: 34 G/DL (ref 31.5–35.7)
MCV RBC AUTO: 94.6 FL (ref 79–97)
MONOCYTES # BLD AUTO: 1.07 10*3/MM3 (ref 0.1–0.9)
MONOCYTES NFR BLD AUTO: 9.8 % (ref 5–12)
NEUTROPHILS NFR BLD AUTO: 7.99 10*3/MM3 (ref 1.7–7)
NEUTROPHILS NFR BLD AUTO: 73.2 % (ref 42.7–76)
NRBC BLD AUTO-RTO: 0 /100 WBC (ref 0–0.2)
NT-PROBNP SERPL-MCNC: 37.9 PG/ML (ref 0–450)
PLATELET # BLD AUTO: 252 10*3/MM3 (ref 140–450)
PMV BLD AUTO: 9.3 FL (ref 6–12)
POTASSIUM SERPL-SCNC: 3.7 MMOL/L (ref 3.5–5.2)
PROT SERPL-MCNC: 6.7 G/DL (ref 6–8.5)
QT INTERVAL: 393 MS
RBC # BLD AUTO: 4.48 10*6/MM3 (ref 4.14–5.8)
SODIUM SERPL-SCNC: 137 MMOL/L (ref 136–145)
TROPONIN I SERPL-MCNC: 0 NG/ML (ref 0–0.6)
TROPONIN I SERPL-MCNC: 0 NG/ML (ref 0–0.6)
WBC # BLD AUTO: 10.91 10*3/MM3 (ref 3.4–10.8)
WHOLE BLOOD HOLD SPECIMEN: NORMAL
WHOLE BLOOD HOLD SPECIMEN: NORMAL

## 2021-10-03 PROCEDURE — 82553 CREATINE MB FRACTION: CPT | Performed by: EMERGENCY MEDICINE

## 2021-10-03 PROCEDURE — 93005 ELECTROCARDIOGRAM TRACING: CPT

## 2021-10-03 PROCEDURE — 93005 ELECTROCARDIOGRAM TRACING: CPT | Performed by: EMERGENCY MEDICINE

## 2021-10-03 PROCEDURE — 83735 ASSAY OF MAGNESIUM: CPT | Performed by: EMERGENCY MEDICINE

## 2021-10-03 PROCEDURE — 80053 COMPREHEN METABOLIC PANEL: CPT | Performed by: EMERGENCY MEDICINE

## 2021-10-03 PROCEDURE — 99283 EMERGENCY DEPT VISIT LOW MDM: CPT

## 2021-10-03 PROCEDURE — 84484 ASSAY OF TROPONIN QUANT: CPT

## 2021-10-03 PROCEDURE — 82550 ASSAY OF CK (CPK): CPT | Performed by: EMERGENCY MEDICINE

## 2021-10-03 PROCEDURE — 83690 ASSAY OF LIPASE: CPT | Performed by: EMERGENCY MEDICINE

## 2021-10-03 PROCEDURE — 93010 ELECTROCARDIOGRAM REPORT: CPT | Performed by: INTERNAL MEDICINE

## 2021-10-03 PROCEDURE — 71045 X-RAY EXAM CHEST 1 VIEW: CPT

## 2021-10-03 PROCEDURE — 83880 ASSAY OF NATRIURETIC PEPTIDE: CPT | Performed by: EMERGENCY MEDICINE

## 2021-10-03 PROCEDURE — 36415 COLL VENOUS BLD VENIPUNCTURE: CPT

## 2021-10-03 PROCEDURE — 85025 COMPLETE CBC W/AUTO DIFF WBC: CPT

## 2021-10-03 RX ORDER — SODIUM CHLORIDE 0.9 % (FLUSH) 0.9 %
10 SYRINGE (ML) INJECTION AS NEEDED
Status: DISCONTINUED | OUTPATIENT
Start: 2021-10-03 | End: 2021-10-03 | Stop reason: HOSPADM

## 2021-10-03 RX ORDER — ASPIRIN 81 MG/1
324 TABLET, CHEWABLE ORAL ONCE
Status: COMPLETED | OUTPATIENT
Start: 2021-10-03 | End: 2021-10-03

## 2021-10-03 RX ADMIN — ASPIRIN 324 MG: 81 TABLET, CHEWABLE ORAL at 09:42

## 2021-10-03 NOTE — ED PROVIDER NOTES
Time: 11:01 AM EDT  Arrived by: private car  Chief Complaint: Abnormal EKG  History provided by: Patient  History is limited by: N/A     History of Present Illness:  Patient is a 41 y.o. year old male that presents to the emergency department with an abnormal EKG.     The patient went to an Urgent Care yesterday for intermittent dizziness, dull chest pain, and nausea. He told to come to the ED due to high blood pressure (150s) and an abnormal EKG. The patient's symptoms have resolved. He only came to the emergency department because Urgent Care told him to.    He has a history of HTN and is on medications for it.       Patient Care Team  Primary Care Provider: Carlee Arroyo MD    Past Medical History:     No Known Allergies  Past Medical History:   Diagnosis Date   • Allergies    • Anxiety    • Bipolar disorder (HCC)    • Condition not found     Hernia   • Depression    • Eczema    • Forgetfulness    • Gross hematuria currently   • Head injury     no residual    • Hypertension    • Migraine    • Sinus trouble    • SOB (shortness of breath)     none currently    • STD exposure      Past Surgical History:   Procedure Laterality Date   • CYSTOSCOPY RETROGRADE PYELOGRAM Bilateral 9/8/2021    Procedure: Cystoscopy with bilateral retrograde pyelograms;  Surgeon: Randell Coats MD;  Location: [facility] MAIN OR;  Service: Urology;  Laterality: Bilateral;     Family History   Problem Relation Age of Onset   • Breast cancer Mother    • Diabetes Father    • Diabetes Paternal Grandmother    • Diabetes Paternal Grandfather        Home Medications:  Prior to Admission medications    Medication Sig Start Date End Date Taking? Authorizing Provider   busPIRone (BUSPAR) 5 MG tablet Take 5 mg by mouth Daily. 7/20/21   Emergency, Nurse Radha, RN        Social History:   Social History     Tobacco Use   • Smoking status: Current Every Day Smoker     Packs/day: 0.50     Years: 5.00     Pack years: 2.50     Types: Cigarettes   •  "Smokeless tobacco: Never Used   Vaping Use   • Vaping Use: Never used   Substance Use Topics   • Alcohol use: Yes     Comment: occ   • Drug use: Yes     Types: Marijuana     Comment: occasional     Recent travel: not applicable     Review of Systems:  Review of Systems   Constitutional: Negative for chills and fever.   HENT: Negative for sore throat.    Eyes: Negative for photophobia.   Respiratory: Negative for shortness of breath.    Cardiovascular: Positive for chest pain (dull, resolved).        Abnormal EKG   Gastrointestinal: Positive for nausea (resolved). Negative for abdominal pain, diarrhea and vomiting.   Genitourinary: Negative for dysuria.   Musculoskeletal: Negative for neck pain.   Skin: Negative for wound.   Neurological: Positive for dizziness (resolved). Negative for headaches.   All other systems reviewed and are negative.       Physical Exam:  /97   Pulse 58   Temp 98.2 °F (36.8 °C) (Oral)   Resp 16   Ht 165.1 cm (65\")   Wt 84 kg (185 lb 3 oz)   SpO2 100%   BMI 30.82 kg/m²     Physical Exam  Vitals and nursing note reviewed.   Constitutional:       General: He is not in acute distress.  HENT:      Head: Normocephalic and atraumatic.   Eyes:      Extraocular Movements: Extraocular movements intact.   Cardiovascular:      Rate and Rhythm: Normal rate and regular rhythm.   Pulmonary:      Effort: Pulmonary effort is normal. No respiratory distress.      Breath sounds: Normal breath sounds.   Abdominal:      General: Abdomen is flat.      Palpations: Abdomen is soft.      Tenderness: There is no abdominal tenderness.   Musculoskeletal:         General: Normal range of motion.      Cervical back: Normal range of motion and neck supple.   Skin:     General: Skin is warm and dry.      Capillary Refill: Capillary refill takes less than 2 seconds.   Neurological:      Mental Status: He is alert and oriented to person, place, and time. Mental status is at baseline.                Medications " in the Emergency Department:  Medications   sodium chloride 0.9 % flush 10 mL (has no administration in time range)   aspirin chewable tablet 324 mg (324 mg Oral Given 10/3/21 0942)        Labs  Lab Results (last 24 hours)     Procedure Component Value Units Date/Time    POC Troponin I [[id number]]  (Normal) Collected: 10/03/21 0916    Specimen: Blood Updated: 10/03/21 0928     Troponin I 0.00 ng/mL      Comment: Serial Number: [id number]:  [id number]       CBC & Differential [[id number]]  (Abnormal) Collected: 10/03/21 0917    Specimen: Blood from Arm, Right Updated: 10/03/21 0927    Narrative:      The following orders were created for panel order CBC & Differential.  Procedure                               Abnormality         Status                     ---------                               -----------         ------                     CBC Auto Differential[[id number]]        Abnormal            Final result                 Please view results for these tests on the individual orders.    Comprehensive Metabolic Panel [[id number]] Collected: 10/03/21 0917    Specimen: Blood from Arm, Right Updated: 10/03/21 0949     Glucose 95 mg/dL      BUN 11 mg/dL      Creatinine 1.08 mg/dL      Sodium 137 mmol/L      Potassium 3.7 mmol/L      Chloride 101 mmol/L      CO2 26.3 mmol/L      Calcium 9.0 mg/dL      Total Protein 6.7 g/dL      Albumin 4.50 g/dL      ALT (SGPT) 22 U/L      AST (SGOT) 25 U/L      Alkaline Phosphatase 56 U/L      Total Bilirubin 1.0 mg/dL      eGFR  African Amer 91 mL/min/1.73      Globulin 2.2 gm/dL      A/G Ratio 2.0 g/dL      BUN/Creatinine Ratio 10.2     Anion Gap 9.7 mmol/L     Narrative:      GFR Normal >60  Chronic Kidney Disease <60  Kidney Failure <15      Lipase [[id number]]  (Normal) Collected: 10/03/21 0917    Specimen: Blood from Arm, Right Updated: 10/03/21 0949     Lipase 24 U/L     BNP [[id number]]  (Normal) Collected: 10/03/21 0917    Specimen: Blood from Arm, Right Updated: 10/03/21 0948      proBNP 37.9 pg/mL     Narrative:      Among patients with dyspnea, NT-proBNP is highly sensitive for the detection of acute congestive heart failure. In addition NT-proBNP of <300 pg/ml effectively rules out acute congestive heart failure with 99% negative predictive value.    Results may be falsely decreased if patient taking Biotin.      Magnesium [[id number]]  (Normal) Collected: 10/03/21 0917    Specimen: Blood from Arm, Right Updated: 10/03/21 0949     Magnesium 2.2 mg/dL     CK Total & CKMB [[id number]]  (Abnormal) Collected: 10/03/21 0917    Specimen: Blood from Arm, Right Updated: 10/03/21 0949     CKMB 7.97 ng/mL      Creatine Kinase 976 U/L     Narrative:      CKMB results may be falsely decreased if patient taking Biotin.    CBC Auto Differential [[id number]]  (Abnormal) Collected: 10/03/21 0917    Specimen: Blood from Arm, Right Updated: 10/03/21 0927     WBC 10.91 10*3/mm3      RBC 4.48 10*6/mm3      Hemoglobin 14.4 g/dL      Hematocrit 42.4 %      MCV 94.6 fL      MCH 32.1 pg      MCHC 34.0 g/dL      RDW 13.0 %      RDW-SD 45.4 fl      MPV 9.3 fL      Platelets 252 10*3/mm3      Neutrophil % 73.2 %      Lymphocyte % 15.2 %      Monocyte % 9.8 %      Eosinophil % 1.5 %      Basophil % 0.1 %      Immature Grans % 0.2 %      Neutrophils, Absolute 7.99 10*3/mm3      Lymphocytes, Absolute 1.66 10*3/mm3      Monocytes, Absolute 1.07 10*3/mm3      Eosinophils, Absolute 0.16 10*3/mm3      Basophils, Absolute 0.01 10*3/mm3      Immature Grans, Absolute 0.02 10*3/mm3      nRBC 0.0 /100 WBC     POC Troponin I [[id number]]  (Normal) Collected: 10/03/21 1117    Specimen: Blood Updated: 10/03/21 1130     Troponin I 0.00 ng/mL      Comment: Serial Number: [id number]:  [id number]              Imaging:  XR Chest 1 View    Result Date: 10/3/2021  PROCEDURE: XR CHEST 1 VW  COMPARISON: [facility], , CHEST AP/PA 1 VIEW, 7/29/2020, 13:24.  INDICATIONS: Chest Pain triage protocol  FINDINGS:  The heart is not  enlarged.  The lungs seem clear.  There are no pleural effusions.  The visualized osseous structures do not appear unusual.  CONCLUSION:  1. An acute pulmonary process is not apparent.       JOSUE HULL MD       Electronically Signed and Approved By: JOSUE HULL MD on 10/03/2021 at 10:10               Procedures:  Procedures    Progress  ED Course as of Oct 03 1220   Sun Oct 03, 2021   0910 EKG:    Rhythm: nsr  Rate: 62  Intervals: normal  T-wave: normal  ST Segment: early repolarization    EKG Comparison: 10/2/21 similar    Interpreted by me      [NL]      ED Course User Index  [NL] Vahid Brown DO    HEART SCORE  History: Slightly Suspicious (0)  ECG: Nonspecific repolarization or LBBB (1)  Age: Less than 45 years (0)  Risk factors: 1-2 Risk Factors (1)  Troponin: normal (0)    This patient's HEART score is 1.    According to the HEART Score Study: Score (Risk of adverse cardiac event in the next 14 days)  Scores 0-3: (0.9-1.7%) In the HEART Score study, these patients were discharged home.  Scores 4-6: (12-16.6%)  In the HEART Score study, these patients were admitted to the hospital.  Scores ?7: (50-65%) In the HEART Score study, these patients were candidates for early invasive measures.   Final disposition is based on individual provider judgement and current clinical situation.                           Medical Decision Making:  MDM   41-year-old male patient presents after being seen at an urgent care yesterday. He had an episode of chest pain which she described as a dull ache and felt lightheaded while at work. At the urgent care he was told his EKG was abnormal. He was told to come to the emergency department. Patient chose to not come until today. His EKG looks to show early repolarization but no other concerning change or ST elevation. The patient is completely symptom-free today. Patient's chest x-ray is negative. Of note his CPK is elevated. The patient has a history on chart review of  significantly elevated CPKs. Today's is the lowest the 900 range. I advised the patient to increase his fluids. Patient has previously been seen by , cardiology, and is stable for discharge with outpatient follow-up for cardiac stress testing.      Final diagnoses:   Chest pain of unknown etiology   Non-traumatic rhabdomyolysis        Disposition:  ED Disposition     ED Disposition Condition Comment    Discharge Stable           Documentation assistance provided by Melissa Mtz acting as scribe for Vahid Brown DO. Information recorded by the scribe was done at my direction and has been verified and validated by me.        Melissa Mtz  10/03/21 1109       Vahid Brown DO  10/03/21 1220

## 2021-10-05 NOTE — PROGRESS NOTES
Chief Complaint    Urologic complaint    Subjective          Darrion Anglin Jr. presents to [facility]  History of Present Illness    41 y.o. male presents to [facility] to be seen for gross hematuria     Patient is doing okay.  No gross hematuria no pain.    Patient is doing fine    9/8/2021 cystoscopy bilateral retrogrades-2 cm prostate, normal bladder, normal bilateral retrogrades    Previous    Patient saw gross hematuria back in April.  Asymptomatic.    No trauma.  No anticoagulation     Voiding without issue.  No burning or dysuria.     4/21 CT abdomen/pelvis with - negative, no delayed     Patient does use marijuana, history of cocaine not doing this currently per patient.      No history of kidney  stone.    No family history,   Has never had any urologic surgery.    No cardiopulmonary history.  Smoker patient does not use blood thinner.          Past History:  Medical History: has a past medical history of Allergies, Anxiety, Bipolar disorder (HCC), Condition not found, Depression, Eczema, Forgetfulness, Gross hematuria (currently), Head injury, Hypertension, Migraine, Sinus trouble, SOB (shortness of breath), and STD exposure.   Surgical History: has a past surgical history that includes Retrograde pyelogram (Bilateral, 9/8/2021).   Family History: family history includes Breast cancer in his mother; Diabetes in his father, paternal grandfather, and paternal grandmother.   Social History: reports that he has been smoking cigarettes. He has a 2.50 pack-year smoking history. He has never used smokeless tobacco. He reports current alcohol use. He reports current drug use. Drug: Marijuana.  Allergies: Patient has no known allergies.       Current Outpatient Medications:   •  busPIRone (BUSPAR) 5 MG tablet, Take 5 mg by mouth Daily., Disp: , Rfl:      Physical exam       Alert and orient x3  Well appearing, well developed, in no acute distress   Unlabored  respirations      Grossly oriented to person, place and time, judgment is intact, normal mood and affect         Objective     Vital Signs:   There were no vitals taken for this visit.             Assessment and Plan    Diagnoses and all orders for this visit:    1. Microhematuria (Primary)      Hematuria work-up negative, patient given reassurance.  He will follow-up with nurse practitioner in 1 year with [facility] with micro

## 2021-10-08 ENCOUNTER — OFFICE VISIT (OUTPATIENT)
Dept: UROLOGY | Facility: CLINIC | Age: 41
End: 2021-10-08

## 2021-10-08 VITALS — HEIGHT: 65 IN | BODY MASS INDEX: 31.22 KG/M2 | WEIGHT: 187.4 LBS

## 2021-10-08 DIAGNOSIS — R31.29 MICROHEMATURIA: Primary | ICD-10-CM

## 2021-10-08 PROCEDURE — 99212 OFFICE O/P EST SF 10 MIN: CPT | Performed by: UROLOGY

## 2021-10-08 RX ORDER — TRAZODONE HYDROCHLORIDE 100 MG/1
TABLET ORAL
COMMUNITY
Start: 2021-09-13 | End: 2021-10-15 | Stop reason: ALTCHOICE

## 2021-10-08 RX ORDER — BUSPIRONE HYDROCHLORIDE 10 MG/1
TABLET ORAL
COMMUNITY
Start: 2021-09-13

## 2021-10-15 ENCOUNTER — OFFICE VISIT (OUTPATIENT)
Dept: FAMILY MEDICINE CLINIC | Facility: CLINIC | Age: 41
End: 2021-10-15

## 2021-10-15 VITALS
OXYGEN SATURATION: 98 % | TEMPERATURE: 98 F | WEIGHT: 187.4 LBS | HEIGHT: 65 IN | SYSTOLIC BLOOD PRESSURE: 158 MMHG | HEART RATE: 100 BPM | BODY MASS INDEX: 31.22 KG/M2 | DIASTOLIC BLOOD PRESSURE: 94 MMHG

## 2021-10-15 DIAGNOSIS — I10 PRIMARY HYPERTENSION: ICD-10-CM

## 2021-10-15 DIAGNOSIS — R07.9 CHEST PAIN, UNSPECIFIED TYPE: Primary | ICD-10-CM

## 2021-10-15 DIAGNOSIS — R94.31 ABNORMAL EKG: ICD-10-CM

## 2021-10-15 PROCEDURE — 99214 OFFICE O/P EST MOD 30 MIN: CPT | Performed by: FAMILY MEDICINE

## 2021-10-15 RX ORDER — METOPROLOL SUCCINATE 25 MG/1
25 TABLET, EXTENDED RELEASE ORAL DAILY
Qty: 90 TABLET | Refills: 0 | Status: SHIPPED | OUTPATIENT
Start: 2021-10-15 | End: 2022-01-13

## 2021-10-15 RX ORDER — ASPIRIN 81 MG/1
81 TABLET ORAL DAILY
Qty: 90 TABLET | Refills: 3 | Status: SHIPPED | OUTPATIENT
Start: 2021-10-15 | End: 2022-01-13

## 2021-10-15 RX ORDER — TRAZODONE HYDROCHLORIDE 150 MG/1
1 TABLET ORAL DAILY
COMMUNITY
Start: 2021-10-12

## 2021-10-15 RX ORDER — SERTRALINE HYDROCHLORIDE 100 MG/1
1 TABLET, FILM COATED ORAL DAILY
COMMUNITY
Start: 2021-10-12

## 2021-10-15 NOTE — PROGRESS NOTES
Chief Complaint  Chief Complaint   Patient presents with   • Hospital Follow Up Visit     ER -EKG abnormal   • Hypertension       HPI:  Darrion Anglin . presents to [facility]     Pt was seen in the ER on 10/3/2021 for chest pain and had an abnormal EKG.  Pt reports he still occasionally gets the chest pain. Pt bp today is not well controlled at 158/94. Pt reports that he is still smoking.  I informed him of his increased risk of heart attack and stroke with his heart issues and smoking. Pt has not been following up with Cardiology but I will be putting in the referral today for him to get back in. My concern is for CAD.    Medical History:  Past History:  Medical History: has a past medical history of Allergies, Anxiety, Bipolar disorder (HCC), Condition not found, Depression, Eczema, Forgetfulness, Gross hematuria (currently), Head injury, Hypertension, Migraine, Sinus trouble, SOB (shortness of breath), and STD exposure.   Surgical History: has a past surgical history that includes Retrograde pyelogram (Bilateral, 9/8/2021).   Family History: family history includes Breast cancer in his mother; Diabetes in his father, paternal grandfather, and paternal grandmother.   Social History: reports that he has been smoking cigarettes. He has a 2.50 pack-year smoking history. He has never used smokeless tobacco. He reports current alcohol use. He reports current drug use. Drug: Marijuana.  Immunization History   Administered Date(s) Administered   • Flu Vaccine Intradermal Quad 18-64YR 10/01/2020   • Flu Vaccine Quad PF >36MO 10/01/2019   • Influenza TIV (IM) 10/04/2018   • Influenza, Unspecified 10/01/2019         Allergies: Patient has no known allergies.     Medications:  Current Outpatient Medications on File Prior to Visit   Medication Sig Dispense Refill   • busPIRone (BUSPAR) 10 MG tablet      • sertraline (ZOLOFT) 100 MG tablet Take 1 tablet by mouth Daily.     • sertraline  "(ZOLOFT) 50 MG tablet      • traZODone (DESYREL) 150 MG tablet Take 1 tablet by mouth Daily.       No current facility-administered medications on file prior to visit.        Health Maintenance Due   Topic Date Due   • ANNUAL PHYSICAL  Never done   • Pneumococcal Vaccine 0-64 (1 of 2 - PPSV23) Never done   • COVID-19 Vaccine (1) Never done   • TDAP/TD VACCINES (1 - Tdap) Never done   • HEPATITIS C SCREENING  Never done   • INFLUENZA VACCINE  08/01/2021       Vital Signs:   Vitals:    10/15/21 1617   BP: 158/94   Pulse: 100   Temp: 98 °F (36.7 °C)   SpO2: 98%   Weight: 85 kg (187 lb 6.4 oz)   Height: 165.1 cm (65\")      Body mass index is 31.18 kg/m².     ROS:  Review of Systems   Constitutional: Negative for fatigue and fever.   HENT: Negative for congestion and ear pain.    Eyes: Negative for blurred vision and discharge.   Respiratory: Negative for cough and shortness of breath.    Cardiovascular: Negative for chest pain, palpitations and leg swelling.   Gastrointestinal: Negative for abdominal distention, abdominal pain, constipation and diarrhea.   Genitourinary: Negative for difficulty urinating and dysuria.   Musculoskeletal: Negative for back pain and gait problem.   Skin: Negative for rash and skin lesions.   Neurological: Negative for headache, memory problem and confusion.   Psychiatric/Behavioral: Negative for behavioral problems and depressed mood.          Physical Exam  Constitutional:       Appearance: Normal appearance.   HENT:      Head: Normocephalic.      Right Ear: Tympanic membrane normal.      Left Ear: Tympanic membrane normal.      Nose: Nose normal.      Mouth/Throat:      Mouth: Mucous membranes are moist.   Eyes:      Extraocular Movements: Extraocular movements intact.      Conjunctiva/sclera: Conjunctivae normal.      Pupils: Pupils are equal, round, and reactive to light.   Cardiovascular:      Rate and Rhythm: Normal rate and regular rhythm.      Pulses: Normal pulses.      Heart " sounds: Normal heart sounds.   Pulmonary:      Effort: Pulmonary effort is normal.      Breath sounds: Normal breath sounds.   Abdominal:      General: Bowel sounds are normal.      Palpations: Abdomen is soft.   Musculoskeletal:         General: Normal range of motion.      Cervical back: Normal range of motion.   Skin:     General: Skin is warm and dry.   Neurological:      General: No focal deficit present.      Mental Status: He is alert and oriented to person, place, and time.   Psychiatric:         Mood and Affect: Mood normal.         Behavior: Behavior normal.          Result Review        Diagnoses and all orders for this visit:    1. Chest pain, unspecified type (Primary)  -     Ambulatory Referral to Cardiology    2. Abnormal EKG  -     Ambulatory Referral to Cardiology    3. Primary hypertension  -     metoprolol succinate XL (Toprol XL) 25 MG 24 hr tablet; Take 1 tablet by mouth Daily for 90 days.  Dispense: 90 tablet; Refill: 0  -     aspirin (aspirin) 81 MG EC tablet; Take 1 tablet by mouth Daily for 90 days.  Dispense: 90 tablet; Refill: 3                Follow Up   No follow-ups on file.  Patient was given instructions and counseling regarding his condition or for health maintenance advice. Please see specific information pulled into the AVS if appropriate.       Carlee Arroyo MD

## 2021-10-27 ENCOUNTER — OFFICE VISIT (OUTPATIENT)
Dept: FAMILY MEDICINE CLINIC | Facility: CLINIC | Age: 41
End: 2021-10-27

## 2021-10-27 VITALS
HEIGHT: 65 IN | DIASTOLIC BLOOD PRESSURE: 62 MMHG | SYSTOLIC BLOOD PRESSURE: 132 MMHG | HEART RATE: 81 BPM | WEIGHT: 192 LBS | OXYGEN SATURATION: 99 % | TEMPERATURE: 97.5 F | BODY MASS INDEX: 31.99 KG/M2

## 2021-10-27 DIAGNOSIS — R30.0 DYSURIA: Primary | ICD-10-CM

## 2021-10-27 DIAGNOSIS — Z20.2 STD EXPOSURE: ICD-10-CM

## 2021-10-27 LAB
C TRACH RRNA CVX QL NAA+PROBE: NOT DETECTED
N GONORRHOEA RRNA SPEC QL NAA+PROBE: DETECTED

## 2021-10-27 PROCEDURE — 87491 CHLMYD TRACH DNA AMP PROBE: CPT | Performed by: NURSE PRACTITIONER

## 2021-10-27 PROCEDURE — 99213 OFFICE O/P EST LOW 20 MIN: CPT | Performed by: NURSE PRACTITIONER

## 2021-10-27 PROCEDURE — 87591 N.GONORRHOEAE DNA AMP PROB: CPT | Performed by: NURSE PRACTITIONER

## 2021-10-27 PROCEDURE — 96372 THER/PROPH/DIAG INJ SC/IM: CPT | Performed by: NURSE PRACTITIONER

## 2021-10-27 RX ORDER — AZITHROMYCIN 500 MG/1
1000 TABLET, FILM COATED ORAL ONCE
Qty: 2 TABLET | Refills: 0 | Status: SHIPPED | OUTPATIENT
Start: 2021-10-27 | End: 2021-10-27

## 2021-10-27 RX ORDER — CEFTRIAXONE 500 MG/1
500 INJECTION, POWDER, FOR SOLUTION INTRAMUSCULAR; INTRAVENOUS ONCE
Status: COMPLETED | OUTPATIENT
Start: 2021-10-27 | End: 2021-10-27

## 2021-10-27 RX ORDER — METRONIDAZOLE 500 MG/1
2000 TABLET ORAL ONCE
Qty: 4 TABLET | Refills: 0 | Status: SHIPPED | OUTPATIENT
Start: 2021-10-27 | End: 2021-10-27

## 2021-10-27 RX ADMIN — CEFTRIAXONE 500 MG: 500 INJECTION, POWDER, FOR SOLUTION INTRAMUSCULAR; INTRAVENOUS at 15:24

## 2021-10-27 NOTE — PROGRESS NOTES
"Chief Complaint  Exposure to STD    Subjective          Darrion Anglin Jr. presents to [facility]  History of Present Illness  Patient presents today with complaints of STD exposure and now having burning with urination and penile discharge.  He states this is been going on for couple days.  Objective   Vital Signs:   /62   Pulse 81   Temp 97.5 °F (36.4 °C)   Ht 165.1 cm (65\")   Wt 87.1 kg (192 lb)   SpO2 99%   BMI 31.95 kg/m²     Physical Exam  Vitals reviewed.   Constitutional:       Appearance: Normal appearance. He is well-developed.   Cardiovascular:      Rate and Rhythm: Normal rate and regular rhythm.      Heart sounds: No murmur heard.  No friction rub. No gallop.    Pulmonary:      Effort: Pulmonary effort is normal.      Breath sounds: Normal breath sounds. No wheezing or rhonchi.   Skin:     General: Skin is warm and dry.   Neurological:      Mental Status: He is alert and oriented to person, place, and time.      Cranial Nerves: No cranial nerve deficit.   Psychiatric:         Mood and Affect: Mood and affect normal.         Behavior: Behavior normal.         Thought Content: Thought content normal. Thought content does not include homicidal or suicidal ideation.         Judgment: Judgment normal.        Result Review :                 Assessment and Plan    Diagnoses and all orders for this visit:    1. Dysuria (Primary)  -     Chlamydia trachomatis, Neisseria gonorrhoeae, PCR - Urine, Urine, Clean Catch  -     cefTRIAXone (ROCEPHIN) injection 500 mg    2. STD exposure  Assessment & Plan:  Due to his symptoms and STD exposure, will treat him empirically with Rocephin 500 mg IM once today, will have him take 1 g of Zithromax and 2 g of Flagyl x1 dose each, prescription sent and patient instructed.    Orders:  -     Chlamydia trachomatis, Neisseria gonorrhoeae, PCR - Urine, Urine, Clean Catch  -     cefTRIAXone (ROCEPHIN) injection 500 mg    Other orders  -    "  azithromycin (Zithromax) 500 MG tablet; Take 2 tablets by mouth 1 (One) Time for 1 dose.  Dispense: 2 tablet; Refill: 0  -     metroNIDAZOLE (Flagyl) 500 MG tablet; Take 4 tablets by mouth 1 (One) Time for 1 dose.  Dispense: 4 tablet; Refill: 0      Follow Up   Return if symptoms worsen or fail to improve.  Patient was given instructions and counseling regarding his condition or for health maintenance advice. Please see specific information pulled into the AVS if appropriate.

## 2021-10-27 NOTE — ASSESSMENT & PLAN NOTE
Due to his symptoms and STD exposure, will treat him empirically with Rocephin 500 mg IM once today, will have him take 1 g of Zithromax and 2 g of Flagyl x1 dose each, prescription sent and patient instructed.

## 2021-10-27 NOTE — PATIENT INSTRUCTIONS
Preventing Sexually Transmitted Infections, Adult  Sexually transmitted infections (STIs) are diseases that are spread from person to person (are contagious). They are spread, or transmitted, through bodily fluids exchanged during sex or sexual contact. These bodily fluids include saliva, semen, blood, vaginal mucus, and urine. STIs are very common among people of all ages.  Some common STIs include:  · Herpes.  · Hepatitis B.  · Chlamydia.  · Gonorrhea.  · Syphilis.  · HPV (human papillomavirus).  · HIV, also called the human immunodeficiency virus. This is the virus that can cause AIDS (acquired immunodeficiency syndrome).  Often, people who have these STIs do not have symptoms. Even without symptoms, these infections can be spread from person to person and require treatment.  How can these conditions affect me?  STIs can be treated, and many STIs can be cured. However, some STIs cannot be cured and will affect you for the rest of your life.  Certain STIs may:  · Require you to take medicine for the rest of your life.  · Affect your ability to have children (your fertility).  · Increase your risk for developing another STI or certain serious health conditions. These may include:  ? Cervical cancer.  ? Head and neck cancer.  ? Pelvic inflammatory disease (PID), in women.  ? Organ damage or damage to other parts of your body, if the infection spreads.  · Cause problems during pregnancy and may be transmitted to the baby during the pregnancy or childbirth.  What can increase my risk?  You may have an increased risk for developing an STI if:  · You have unprotected sex. Sex includes oral, vaginal, or anal sex.  · You have more than one sex partner.  · You have a sex partner who has multiple sex partners.  · You have sex with someone who has an STI.  · You have an STI or you had an STI before.  · You inject drugs or have a sex partner or partners who inject drugs.  What actions can I take to prevent STIs?  The only way  to completely prevent STIs is not to have sex of any kind. This is called practicing abstinence. If you are sexually active, you can protect yourself and others by taking these actions to lower your risk of getting an STI:  Lifestyle  Avoid mixing alcohol, drugs, and sex. Alcohol and drug use can affect your ability to make good decisions and can lead to risky sexual behaviors.  Medicines  Ask your health care provider about taking pre-exposure prophylaxis (PrEP) to prevent HIV infection.  General information    · Stay up to date on vaccinations. Certain vaccines can lower your risk of getting certain STIs, such as:  ? Hepatitis A and hepatitis B vaccines. You may have been vaccinated as a young child, but you will likely need a booster shot as a teen or young adult.  ? HPV (human papillomavirus) vaccine.  · Have only one sex partner (be monogamous) or limit the number of sex partners you have.  · Use methods that prevent the exchange of body fluids between partners (barrier protection) correctly every time you have sex. Barrier protection can be used during oral, vaginal, or anal sex. Commonly used barrier methods include:  ? Male condom.  ? Female condom.  ? Dental dam.  · Use a new condom for every sex act from start to finish.  · Get tested for STIs. Have your partners get tested, too.  · If you test positive for an STI, follow recommendations from your health care provider about treatment and make sure your sex partners are tested and treated.  · Birth control pills, injections, implants, and intrauterine devices (IUDs) do not protect against STIs. To prevent both STIs and pregnancy, always use a condom with another form of birth control.  · Some STIs, such as herpes, are spread through skin-to-skin contact. A condom may not protect you from getting such STIs. Avoid all sexual contact if you or your partners have herpes and there is an active flare with open sores.    Where to find more information  Learn more  about STIs from:  · Centers for Disease Control and Prevention:  ? More information about specific STIs: cdc.gov/std  ? Places to get sexual health counseling and treatment for free or at a low cost: gettested.cdc.gov  · [place] Department of Health and Human Services: www.womenshealth.gov  Summary  · Sexually transmitted infections (STIs) can spread through exchanging bodily fluids during sexual contact. Fluids include saliva, semen, blood, vaginal mucus, and urine.  · You may have an increased risk for developing an STI if you have unprotected sex. Sex includes oral, vaginal, or anal sex.  · If you do have sex, limit your number of sex partners and use barrier protection every time you have sex.  This information is not intended to replace advice given to you by your health care provider. Make sure you discuss any questions you have with your health care provider.  Document Revised: 02/01/2021 Document Reviewed: 02/01/2021  Elsevier Patient Education © 2021 [employer]

## 2021-11-15 ENCOUNTER — TELEPHONE (OUTPATIENT)
Dept: FAMILY MEDICINE CLINIC | Facility: CLINIC | Age: 41
End: 2021-11-15

## 2021-11-15 NOTE — TELEPHONE ENCOUNTER
PATIENT MISSED APPOINTMENT ON 11/15/2021 @ 1000 WITH DR. CORNEJO. CALLED NUMBER IN CHART, [phone]. EXPLAINED POLICY CONCERNING MISSED APPOINTMENTS AND SAME DAY CANCELLATIONS.

## 2021-11-16 ENCOUNTER — TELEPHONE (OUTPATIENT)
Dept: FAMILY MEDICINE CLINIC | Facility: CLINIC | Age: 41
End: 2021-11-16

## 2021-11-16 NOTE — TELEPHONE ENCOUNTER
PT MISSED APPOINTMENT ON 11/15/2021 @ 1000 WITH DR. CORNEJO. CALLED NUMBER IN CHART, [phone]. NO ANSWER- LEFT MESSAGE.   SECOND ATTEMPT - SENDING LETTER

## (undated) DEVICE — Device

## (undated) DEVICE — MEDICINE CUP, GRADUATED, STER: Brand: MEDLINE

## (undated) DEVICE — SOL IRRG H2O BG 3000ML STRL

## (undated) DEVICE — CYSTO/BLADDER IRRIGATION SET, REGULATING CLAMP

## (undated) DEVICE — CYSTO PACK: Brand: MEDLINE INDUSTRIES, INC.

## (undated) DEVICE — CATH URETRL PA CONE TP 8F

## (undated) DEVICE — VAGINAL PREP TRAY: Brand: MEDLINE INDUSTRIES, INC.

## (undated) DEVICE — GLV SURG SENSICARE SLT PF LF 8 STRL

## (undated) DEVICE — CATH URETRL PA SPIRAL TP 5F